# Patient Record
Sex: MALE | Race: WHITE | Employment: OTHER | URBAN - METROPOLITAN AREA
[De-identification: names, ages, dates, MRNs, and addresses within clinical notes are randomized per-mention and may not be internally consistent; named-entity substitution may affect disease eponyms.]

---

## 2017-04-11 DIAGNOSIS — J44.1 CHRONIC OBSTRUCTIVE PULMONARY DISEASE WITH ACUTE EXACERBATION (H): ICD-10-CM

## 2017-04-11 NOTE — TELEPHONE ENCOUNTER
VENTOLIN  (90 BASE) MCG/ACT Inhaler       Last Written Prescription Date: 10/10/16  Last Fill Quantity: 25.5g , # refills: 0    Last Office Visit with G, P or [facility] prescribing provider:  11/15/2016     Future Office Visit:       Date of Last Asthma Action Plan Letter:   There are no preventive care reminders to display for this patient.   Asthma Control Test: No flowsheet data found.    Date of Last Spirometry Test:   11/15/16    MRABELLA Arreola  April 11, 2017  4:13 PM

## 2017-04-12 RX ORDER — ALBUTEROL SULFATE 90 UG/1
2 AEROSOL, METERED RESPIRATORY (INHALATION) EVERY 6 HOURS PRN
Qty: 54 G | Refills: 5 | Status: SHIPPED | OUTPATIENT
Start: 2017-04-12 | End: 2017-07-21

## 2017-05-12 ENCOUNTER — TELEPHONE (OUTPATIENT)
Dept: FAMILY MEDICINE | Facility: CLINIC | Age: 72
End: 2017-05-12

## 2017-05-12 NOTE — TELEPHONE ENCOUNTER
Panel Management Review      Patient has the following on his problem list: None      Composite cancer screening  Chart review shows that this patient is due/due soon for the following Colonoscopy  Summary:    Patient is due/failing the following:   COLONOSCOPY    Action needed:   Patient needs referral/order: colonoscopy    Type of outreach:    Phone, spoke to patient.  Patient declined to do the colonoscopy    Questions for provider review:    None                                                                                                                                    Slim Quiñones [facility]        Chart routed to none .

## 2017-05-15 DIAGNOSIS — I25.10 ASCVD (ARTERIOSCLEROTIC CARDIOVASCULAR DISEASE): ICD-10-CM

## 2017-05-15 NOTE — TELEPHONE ENCOUNTER
clopidogrel (PLAVIX) 75 MG tablet        Last Written Prescription Date: 12/2/2016  Last Fill Quantity: 90,2/23/2017 # refills: 1    Last Office Visit with [facility] or [facility] prescribing provider:  11/15/2016-Dr Zapata   Future Office Visit:       Lab Results   Component Value Date    WBC 8.3 07/21/2014     Lab Results   Component Value Date    RBC 4.34 07/21/2014     Lab Results   Component Value Date    HGB 13.4 07/21/2014     Lab Results   Component Value Date    HCT 38.3 07/21/2014     No components found for: MCT  Lab Results   Component Value Date    MCV 88 07/21/2014     Lab Results   Component Value Date    MCH 30.9 07/21/2014     Lab Results   Component Value Date    MCHC 35.0 07/21/2014     Lab Results   Component Value Date    RDW 12.1 07/21/2014     Lab Results   Component Value Date     07/21/2014     Lab Results   Component Value Date    AST 38 10/08/2013     Lab Results   Component Value Date    ALT 25 11/15/2016     Creatinine   Date Value Ref Range Status   11/15/2016 0.76 0.66 - 1.25 mg/dL Final   ]

## 2017-05-17 RX ORDER — CLOPIDOGREL BISULFATE 75 MG/1
75 TABLET ORAL DAILY
Qty: 90 TABLET | Refills: 0 | Status: SHIPPED | OUTPATIENT
Start: 2017-05-17 | End: 2017-09-11

## 2017-05-17 NOTE — TELEPHONE ENCOUNTER
Routing refill request to provider to review approval because:  LABS NOT UTD, route if appointment due to inform pt  Yara Leiva RN, BSN  Message handled by Nurse Triage.

## 2017-05-26 NOTE — TELEPHONE ENCOUNTER
Patient informed. States it must have been an automatic refill-does not need refill at this time.

## 2017-06-13 DIAGNOSIS — J44.1 CHRONIC OBSTRUCTIVE PULMONARY DISEASE WITH ACUTE EXACERBATION (H): ICD-10-CM

## 2017-06-13 DIAGNOSIS — I25.10 ASCVD (ARTERIOSCLEROTIC CARDIOVASCULAR DISEASE): ICD-10-CM

## 2017-06-13 NOTE — TELEPHONE ENCOUNTER
Pending Prescriptions:                       Disp   Refills    SYMBICORT 160-4.5 MCG/ACT Inhaler [Pharma*10.2 g 4            Sig: INHALE TWO PUFFS BY MOUTH TWICE DAILY     lisinopril (PRINIVIL/ZESTRIL) 10 MG table*90 tab*0            Sig: TAKE ONE TABLET BY MOUTH ONE TIME DAILY         SYMBICORT       Last Written Prescription Date: 12/6/2016  Last Fill Quantity: 1 inhaler, # refills: 5    Last Office Visit with [facility] or [facility] prescribing provider:  11/15/2016Jodi       Future Office Visit:       Date of Last Asthma Action Plan Letter:   There are no preventive care reminders to display for this patient.   Asthma Control Test: No flowsheet data found.    Date of Last Spirometry Test:   No results found for this or any previous visit.        LISINOPRIL      Last Written Prescription Date: 12/2/2016  Last Fill Quantity: 90, # refills: 1  Last Office Visit with [facility] or [facility] prescribing provider: 11/15/2016Jodi           Potassium   Date Value Ref Range Status   11/15/2016 4.3 3.4 - 5.3 mmol/L Final     Creatinine   Date Value Ref Range Status   11/15/2016 0.76 0.66 - 1.25 mg/dL Final     BP Readings from Last 3 Encounters:   11/15/16 138/88   01/06/16 149/83   11/18/15 142/68

## 2017-06-15 ENCOUNTER — MYC REFILL (OUTPATIENT)
Dept: FAMILY MEDICINE | Facility: CLINIC | Age: 72
End: 2017-06-15

## 2017-06-15 DIAGNOSIS — I25.10 ASCVD (ARTERIOSCLEROTIC CARDIOVASCULAR DISEASE): ICD-10-CM

## 2017-06-15 DIAGNOSIS — J44.1 CHRONIC OBSTRUCTIVE PULMONARY DISEASE WITH ACUTE EXACERBATION (H): ICD-10-CM

## 2017-06-15 RX ORDER — BUDESONIDE AND FORMOTEROL FUMARATE DIHYDRATE 160; 4.5 UG/1; UG/1
2 AEROSOL RESPIRATORY (INHALATION) 2 TIMES DAILY
Qty: 10.2 G | Refills: 3 | Status: SHIPPED | OUTPATIENT
Start: 2017-06-15 | End: 2017-07-17

## 2017-06-15 RX ORDER — LISINOPRIL 10 MG/1
10 TABLET ORAL DAILY
Qty: 90 TABLET | Refills: 1 | Status: SHIPPED | OUTPATIENT
Start: 2017-06-15 | End: 2017-12-09

## 2017-06-15 RX ORDER — BUDESONIDE AND FORMOTEROL FUMARATE DIHYDRATE 160; 4.5 UG/1; UG/1
2 AEROSOL RESPIRATORY (INHALATION) 2 TIMES DAILY
Qty: 1 INHALER | Refills: 5 | Status: CANCELLED | OUTPATIENT
Start: 2017-06-15

## 2017-06-15 RX ORDER — LISINOPRIL 10 MG/1
10 TABLET ORAL DAILY
Qty: 90 TABLET | Refills: 1 | Status: CANCELLED | OUTPATIENT
Start: 2017-06-15

## 2017-06-15 NOTE — TELEPHONE ENCOUNTER
Prescription approved per [facility] [place]ill [place]  Rosa Patel RN BS

## 2017-06-15 NOTE — TELEPHONE ENCOUNTER
Message from MyChart:  Original authorizing provider: MD Abhijeet Hankins ERIKAWilmer ShaikhAlvino would like a refill of the following medications:  lisinopril (PRINIVIL/ZESTRIL) 10 MG tablet [Bob Zapata MD]  budesonide-formoterol (SYMBICORT) 160-4.5 MCG/ACT Inhaler [Bob Zapata MD]    Preferred pharmacy: [id number] [place], [place] - [address]    Comment:

## 2017-07-17 ENCOUNTER — MYC MEDICAL ADVICE (OUTPATIENT)
Dept: FAMILY MEDICINE | Facility: CLINIC | Age: 72
End: 2017-07-17

## 2017-07-17 DIAGNOSIS — J44.1 CHRONIC OBSTRUCTIVE PULMONARY DISEASE WITH ACUTE EXACERBATION (H): ICD-10-CM

## 2017-07-17 RX ORDER — BUDESONIDE AND FORMOTEROL FUMARATE DIHYDRATE 160; 4.5 UG/1; UG/1
2 AEROSOL RESPIRATORY (INHALATION) 2 TIMES DAILY
Qty: 10.2 G | Refills: 2 | Status: SHIPPED | OUTPATIENT
Start: 2017-07-17 | End: 2017-10-12

## 2017-07-17 NOTE — TELEPHONE ENCOUNTER
See below, has refills existing, resent   Prescription approved per [facility] [place].  Yara Leiva RN, BSN

## 2017-07-21 ENCOUNTER — OFFICE VISIT (OUTPATIENT)
Dept: FAMILY MEDICINE | Facility: CLINIC | Age: 72
End: 2017-07-21
Payer: MEDICARE

## 2017-07-21 ENCOUNTER — RADIANT APPOINTMENT (OUTPATIENT)
Dept: GENERAL RADIOLOGY | Facility: CLINIC | Age: 72
End: 2017-07-21
Attending: FAMILY MEDICINE
Payer: MEDICARE

## 2017-07-21 VITALS
RESPIRATION RATE: 12 BRPM | DIASTOLIC BLOOD PRESSURE: 76 MMHG | BODY MASS INDEX: 19.4 KG/M2 | HEART RATE: 97 BPM | WEIGHT: 128 LBS | TEMPERATURE: 98.1 F | HEIGHT: 68 IN | SYSTOLIC BLOOD PRESSURE: 137 MMHG | OXYGEN SATURATION: 93 %

## 2017-07-21 DIAGNOSIS — D50.0 IRON DEFICIENCY ANEMIA DUE TO CHRONIC BLOOD LOSS: ICD-10-CM

## 2017-07-21 DIAGNOSIS — Z71.6 ENCOUNTER FOR TOBACCO USE CESSATION COUNSELING: ICD-10-CM

## 2017-07-21 DIAGNOSIS — E78.5 HYPERLIPIDEMIA LDL GOAL <100: ICD-10-CM

## 2017-07-21 DIAGNOSIS — G40.909 SEIZURE DISORDER (H): Primary | ICD-10-CM

## 2017-07-21 DIAGNOSIS — J44.1 CHRONIC OBSTRUCTIVE PULMONARY DISEASE WITH ACUTE EXACERBATION (H): ICD-10-CM

## 2017-07-21 DIAGNOSIS — J44.1 COPD EXACERBATION (H): ICD-10-CM

## 2017-07-21 LAB
ERYTHROCYTE [DISTWIDTH] IN BLOOD BY AUTOMATED COUNT: 15.6 % (ref 10–15)
HCT VFR BLD AUTO: 29.9 % (ref 40–53)
HGB BLD-MCNC: 9.3 G/DL (ref 13.3–17.7)
MCH RBC QN AUTO: 25.4 PG (ref 26.5–33)
MCHC RBC AUTO-ENTMCNC: 31.1 G/DL (ref 31.5–36.5)
MCV RBC AUTO: 82 FL (ref 78–100)
PLATELET # BLD AUTO: 361 10E9/L (ref 150–450)
RBC # BLD AUTO: 3.66 10E12/L (ref 4.4–5.9)
WBC # BLD AUTO: 7.9 10E9/L (ref 4–11)

## 2017-07-21 PROCEDURE — 80053 COMPREHEN METABOLIC PANEL: CPT | Performed by: FAMILY MEDICINE

## 2017-07-21 PROCEDURE — 71020 XR CHEST 2 VW: CPT

## 2017-07-21 PROCEDURE — 83721 ASSAY OF BLOOD LIPOPROTEIN: CPT | Performed by: FAMILY MEDICINE

## 2017-07-21 PROCEDURE — 85027 COMPLETE CBC AUTOMATED: CPT | Performed by: FAMILY MEDICINE

## 2017-07-21 PROCEDURE — 99214 OFFICE O/P EST MOD 30 MIN: CPT | Performed by: FAMILY MEDICINE

## 2017-07-21 PROCEDURE — 36415 COLL VENOUS BLD VENIPUNCTURE: CPT | Performed by: FAMILY MEDICINE

## 2017-07-21 RX ORDER — ALBUTEROL SULFATE 90 UG/1
2 AEROSOL, METERED RESPIRATORY (INHALATION) EVERY 6 HOURS PRN
Qty: 54 G | Refills: 5 | Status: SHIPPED | OUTPATIENT
Start: 2017-07-21 | End: 2018-03-14

## 2017-07-21 RX ORDER — DOXYCYCLINE 100 MG/1
100 CAPSULE ORAL 2 TIMES DAILY
Qty: 20 CAPSULE | Refills: 3 | Status: SHIPPED | OUTPATIENT
Start: 2017-07-21 | End: 2018-03-14

## 2017-07-21 NOTE — MR AVS SNAPSHOT
After Visit Summary   7/21/2017    Abhijeet De Oliveira    MRN: [id number]           Patient Information     Date Of Birth          1945        Visit Information        Provider Department      7/21/2017 2:00 PM Bob Zapata MD [facility]        Today's Diagnoses     Seizure disorder (H)    -  1    COPD exacerbation (H)        Chronic obstructive pulmonary disease with acute exacerbation (H)        Hyperlipidemia LDL goal <100        Encounter for tobacco use cessation counseling           Follow-ups after your visit        Who to contact     If you have questions or need follow up information about today's clinic visit or your schedule please contact [facility] directly at [phone].  Normal or non-critical lab and imaging results will be communicated to you by MyChart, letter or phone within 4 business days after the clinic has received the results. If you do not hear from us within 7 days, please contact the clinic through [employer] or phone. If you have a critical or abnormal lab result, we will notify you by phone as soon as possible.  Submit refill requests through [employer] or call your pharmacy and they will forward the refill request to us. Please allow 3 business days for your refill to be completed.          Additional Information About Your Visit        MyChart Information     [employer] gives you secure access to your electronic health record. If you see a primary care provider, you can also send messages to your care team and make appointments. If you have questions, please call your primary care clinic.  If you do not have a primary care provider, please call [phone] and they will assist you.        Care EveryWhere ID     This is your Care EveryWhere ID. This could be used by other organizations to access your [facility] medical records  [id number]        Your Vitals Were     Pulse Temperature Respirations Height Pulse Oximetry BMI  "(Body Mass Index)    97 98.1  F (36.7  C) (Oral) 12 5' 8\" (1.727 m) 93% 19.46 kg/m2       Blood Pressure from Last 3 Encounters:   07/21/17 137/76   11/15/16 138/88   01/06/16 149/83    Weight from Last 3 Encounters:   07/21/17 128 lb (58.1 kg)   11/15/16 132 lb (59.9 kg)   11/18/15 134 lb (60.8 kg)              We Performed the Following     CBC with platelets     Comprehensive metabolic panel     LDL cholesterol direct     XR Chest 2 Views          Today's Medication Changes          These changes are accurate as of: 7/21/17 11:59 PM.  If you have any questions, ask your nurse or doctor.               These medicines have changed or have updated prescriptions.        Dose/Directions    * order for DME   This may have changed:  Another medication with the same name was added. Make sure you understand how and when to take each.   Used for:  COPD (chronic obstructive pulmonary disease) (H), Hypoxia   Changed by:  Kay Douglas MD        Equipment being ordered: Oxygen- 24 hour portable oxygen at 2 liter flow.   Quantity:  1 Device   Refills:  PRN       * order for DME   This may have changed:  You were already taking a medication with the same name, and this prescription was added. Make sure you understand how and when to take each.   Used for:  Chronic obstructive pulmonary disease with acute exacerbation (H)   Changed by:  Bob Zapata MD        Nebulizer machine   Quantity:  1 Device   Refills:  0       * Notice:  This list has 2 medication(s) that are the same as other medications prescribed for you. Read the directions carefully, and ask your doctor or other care provider to review them with you.         Where to get your medicines      These medications were sent to [id number] - [place], [place] - [address]  [address], [place] [address]     Phone:  [phone]     albuterol 108 (90 BASE) MCG/ACT Inhaler    doxycycline 100 MG capsule         Some of these will need a " paper prescription and others can be bought over the counter.  Ask your nurse if you have questions.     Bring a paper prescription for each of these medications     order for DME                Primary Care Provider Office Phone # Fax #    Bob Zapata [phone]       [facility] [address] [facility] [address]        Equal Access to Services     CONOR FENTON : Hadii aad ku hadasho Soomaali, waaxda luqadaha, qaybta kaalmada adeegyada, waxay idiin hayaan adeeg kharash la'satishn . So [facility] [phone].    ATENCIÓN: Si habla español, tiene a delarosa disposición servicios gratuitos de asistencia lingüística. Llame al [phone].    We comply with applicable federal civil rights laws and [place] laws. We do not discriminate on the basis of race, color, national origin, age, disability sex, sexual orientation or gender identity.            Thank you!     Thank you for choosing [facility]  for your care. Our goal is always to provide you with excellent care. Hearing back from our patients is one way we can continue to improve our services. Please take a few minutes to complete the written survey that you may receive in the mail after your visit with us. Thank you!             Your Updated Medication List - Protect others around you: Learn how to safely use, store and throw away your medicines at www.disposemymeds.org.          This list is accurate as of: 7/21/17 11:59 PM.  Always use your most recent med list.                   Brand Name Dispense Instructions for use Diagnosis    * albuterol 108 (90 BASE) MCG/ACT Inhaler    PROAIR HFA/PROVENTIL HFA/VENTOLIN HFA    3 Inhaler    Inhale 1-2 puffs into the lungs every 4 hours as needed    COPD (chronic obstructive pulmonary disease) (H)       * albuterol 108 (90 BASE) MCG/ACT Inhaler    VENTOLIN HFA    54 g    Inhale 2 puffs into the lungs every 6 hours as needed for shortness of breath / dyspnea or  wheezing    Chronic obstructive pulmonary disease with acute exacerbation (H)       atorvastatin 40 MG tablet    LIPITOR    90 tablet    Take 1 tablet (40 mg) by mouth daily    Hyperlipidemia LDL goal <70       azithromycin 250 MG tablet    ZITHROMAX    18 tablet    Two tablets first day, then one tablet daily for four days.  Begin z-rishabh on 1st day of each month.    COPD (chronic obstructive pulmonary disease) (H)       budesonide-formoterol 160-4.5 MCG/ACT Inhaler    SYMBICORT    10.2 g    Inhale 2 puffs into the lungs 2 times daily    Chronic obstructive pulmonary disease with acute exacerbation (H)       carBAMazepine 200 MG tablet    TEGretol    180 tablet    Take 1 tablet (200 mg) by mouth 2 times daily (1 every morning and 1 every evening)    Seizure disorder (H)       CENTRUM SILVER per tablet      Take 1 tablet by mouth daily 1 tablet daily- MENS        clopidogrel 75 MG tablet    PLAVIX    90 tablet    Take 1 tablet (75 mg) by mouth daily LAST REFILL SEE MD    ASCVD (arteriosclerotic cardiovascular disease)       doxycycline 100 MG capsule    VIBRAMYCIN    20 capsule    Take 1 capsule (100 mg) by mouth 2 times daily    COPD exacerbation (H)       ipratropium - albuterol 0.5 mg/2.5 mg/3 mL 0.5-2.5 (3) MG/3ML neb solution    DUONEB    400 vial    Take 1 vial (3 mLs) by nebulization 4 times daily scheduled and can take up to every 3 hours as needed for shortness of breath.  This order is for a dispense of  400 vials (which is 1200 ml).    Chronic obstructive pulmonary disease with acute exacerbation (H), COPD exacerbation (H)       lisinopril 10 MG tablet    PRINIVIL/ZESTRIL    90 tablet    Take 1 tablet (10 mg) by mouth daily    ASCVD (arteriosclerotic cardiovascular disease)       nitroGLYcerin 0.4 MG sublingual tablet    NITROSTAT    25 tablet    Place 1 tablet (0.4 mg) under the tongue every 5 minutes as needed for chest pain Max of 3 tabs.  If still with symptoms call 911.    ASCVD (arteriosclerotic  cardiovascular disease)       * order for DME     1 Device    Equipment being ordered: Oxygen- 24 hour portable oxygen at 2 liter flow.    COPD (chronic obstructive pulmonary disease) (H), Hypoxia       * order for DME     1 Device    Nebulizer machine    Chronic obstructive pulmonary disease with acute exacerbation (H)       * Notice:  This list has 4 medication(s) that are the same as other medications prescribed for you. Read the directions carefully, and ask your doctor or other care provider to review them with you.

## 2017-07-21 NOTE — PROGRESS NOTES
SUBJECTIVE:                                                    Abhijeet De Oliveira is a 72 year old male who presents to clinic today for the following health issues:    SUBJECTIVE:    CC: Abhijeet De Oliveira is a 72 year old male who presents for severe copd, still smokes but is battling to quit, eats well. Body mass index is 19.46 kg/(m^2).      HPI: no increase in his dyspnea. PFT in the past 9 months   No exacerbation currently.  Needs cancer screen cxr        PROBLEM LIST:                   Patient Active Problem List   Diagnosis     Seizure disorder (H)     Hyperlipidemia LDL goal <70     ASCVD (arteriosclerotic cardiovascular disease)     COPD (chronic obstructive pulmonary disease) (H)     COPD exacerbation (H)     Encounter for tobacco use cessation counseling       PAST MEDICAL HISTORY:                  Past Medical History:   Diagnosis Date     Arthritis      COPD (chronic obstructive pulmonary disease) (H)      Coronary artery disease     Pt. has had two MI's (2002 and 2007)     Hypercholesterolaemia      Osteoarthritis of both hips      Seizure disorder (H)     tegretol       PAST SURGICAL HISTORY:                  Past Surgical History:   Procedure Laterality Date     AMPUTATION      left middle finger     ANGIOPLASTY      coronary angioplasty w/stents 2002 & 2007     COLONOSCOPY       GALLBLADDER SURGERY  2001       CURRENT MEDICATIONS:                  Current Outpatient Prescriptions   Medication Sig Dispense Refill     doxycycline (VIBRAMYCIN) 100 MG capsule Take 1 capsule (100 mg) by mouth 2 times daily 20 capsule 3     albuterol (VENTOLIN HFA) 108 (90 BASE) MCG/ACT Inhaler Inhale 2 puffs into the lungs every 6 hours as needed for shortness of breath / dyspnea or wheezing 54 g 5     order for DME Nebulizer machine 1 Device 0     budesonide-formoterol (SYMBICORT) 160-4.5 MCG/ACT Inhaler Inhale 2 puffs into the lungs 2 times daily 10.2 g 2     lisinopril (PRINIVIL/ZESTRIL) 10 MG tablet Take 1  tablet (10 mg) by mouth daily 90 tablet 1     clopidogrel (PLAVIX) 75 MG tablet Take 1 tablet (75 mg) by mouth daily LAST REFILL SEE MD 90 tablet 0     [DISCONTINUED] albuterol (VENTOLIN HFA) 108 (90 BASE) MCG/ACT Inhaler Inhale 2 puffs into the lungs every 6 hours as needed for shortness of breath / dyspnea or wheezing 54 g 5     carBAMazepine (TEGRETOL) 200 MG tablet Take 1 tablet (200 mg) by mouth 2 times daily (1 every morning and 1 every evening) 180 tablet 3     ipratropium - albuterol 0.5 mg/2.5 mg/3 mL (DUONEB) 0.5-2.5 (3) MG/3ML nebulization Take 1 vial (3 mLs) by nebulization 4 times daily scheduled and can take up to every 3 hours as needed for shortness of breath.  This order is for a dispense of  400 vials (which is 1200 ml). 400 vial 3     atorvastatin (LIPITOR) 40 MG tablet Take 1 tablet (40 mg) by mouth daily 90 tablet 3     azithromycin (ZITHROMAX) 250 MG tablet Two tablets first day, then one tablet daily for four days.  Begin z-rishabh on 1st day of each month. 18 tablet 3     nitroglycerin (NITROSTAT) 0.4 MG SL tablet Place 1 tablet (0.4 mg) under the tongue every 5 minutes as needed for chest pain Max of 3 tabs.  If still with symptoms call 911. 25 tablet 0     albuterol (PROAIR HFA, PROVENTIL HFA, VENTOLIN HFA) 108 (90 BASE) MCG/ACT inhaler Inhale 1-2 puffs into the lungs every 4 hours as needed 3 Inhaler 0     ORDER FOR DME Equipment being ordered: Oxygen- 24 hour portable oxygen at 2 liter flow. 1 Device PRN     Multiple Vitamins-Minerals (CENTRUM SILVER) per tablet Take 1 tablet by mouth daily 1 tablet daily- MENS               FAMILY HISTORY:                   Family History   Problem Relation Age of Onset     Genetic Disorder Mother      Coronary Artery Disease Father      DIABETES Maternal Grandmother        HEALTH MAINTENANCE:                    REVIEW OF OUTSIDE RECORDS: NO    REVIEW OF SYSTEMS:  C: NEGATIVE for fever, chills  I: NEGATIVE for worrisome rashes, moles or lesions  E: NEGATIVE  "for vision changes   R: NEGATIVE for significant cough or SOB  CV: NEGATIVE for chest pain, palpitations   GI: NEGATIVE for nausea, abdominal pain, heartburn, or change in bowel habits  : NEGATIVE for frequency, dysuria, or hematuria  M: NEGATIVE for significant arthralgias or myalgia  N: NEGATIVE for weakness, dizziness or paresthesias or headache  NVS:no headache or balance issus  INTEG:no moles or new rashes  LYMPH:no nodes or night sweats    EXAM:    /76 (BP Location: Left arm, Patient Position: Chair, Cuff Size: Adult Regular)  Pulse 97  Temp 98.1  F (36.7  C) (Oral)  Resp 12  Ht 5' 8\" (1.727 m)  Wt 128 lb (58.1 kg)  SpO2 93%  BMI 19.46 kg/m2  GENERAL APPEARANCE: healthy, alert and no distress   EXAM:  GENERAL APPEARANCE: healthy, alert and no distress  EYES: EOMI,  PERRL  HENT: ear canals and TM's normal and nose and mouth without ulcers or lesions  RESP: lungs clear to auscultation - no rales, rhonchi or wheezes  CV: regular rates and rhythm, normal S1 S2, no S3 or S4 and no murmur, click or rub -  ABDOMEN:  soft, nontender, no HSM or masses and bowel sounds normal  MS: extremities normal- no gross deformities noted, no evidence of inflammation in joints, FROM in all extremities.  MS: evident kyphosis and hyperinflated chest   SKIN: no suspicious lesions or rashes  NEURO: Normal strength and tone, sensory exam grossly normal, mentation intact and speech normal  PSYCH: mentation appears normal and affect normal/bright  LYMPHATICS: No axillary, cervical, inguinal, or supraclavicular nodes  BACK:no tenderness or pain on straight let raise           ASSESSMENT/PLAN  1. Seizure disorder (H)    2. COPD exacerbation (H)    3. Chronic obstructive pulmonary disease with acute exacerbation (H)    4. Hyperlipidemia LDL goal <100    5. Encounter for tobacco use cessation counseling        Quit tobacco  6 month follow up with spirometry  Get FIT test  Monitor labs and meds                     "

## 2017-07-21 NOTE — NURSING NOTE
"Chief Complaint   Patient presents with     Musculoskeletal Problem       Initial /76 (BP Location: Left arm, Patient Position: Chair, Cuff Size: Adult Regular)  Pulse 97  Temp 98.1  F (36.7  C) (Oral)  Resp 12  Ht 5' 8\" (1.727 m)  Wt 128 lb (58.1 kg)  SpO2 93%  BMI 19.46 kg/m2 Estimated body mass index is 19.46 kg/(m^2) as calculated from the following:    Height as of this encounter: 5' 8\" (1.727 m).    Weight as of this encounter: 128 lb (58.1 kg).  Medication Reconciliation: complete   Slim Quiñones CMA       "

## 2017-07-22 LAB
ALBUMIN SERPL-MCNC: 3.4 G/DL (ref 3.4–5)
ALP SERPL-CCNC: 121 U/L (ref 40–150)
ALT SERPL W P-5'-P-CCNC: 14 U/L (ref 0–70)
ANION GAP SERPL CALCULATED.3IONS-SCNC: 7 MMOL/L (ref 3–14)
AST SERPL W P-5'-P-CCNC: 17 U/L (ref 0–45)
BILIRUB SERPL-MCNC: 0.3 MG/DL (ref 0.2–1.3)
BUN SERPL-MCNC: 6 MG/DL (ref 7–30)
CALCIUM SERPL-MCNC: 8.4 MG/DL (ref 8.5–10.1)
CHLORIDE SERPL-SCNC: 102 MMOL/L (ref 94–109)
CO2 SERPL-SCNC: 29 MMOL/L (ref 20–32)
CREAT SERPL-MCNC: 0.73 MG/DL (ref 0.66–1.25)
GFR SERPL CREATININE-BSD FRML MDRD: ABNORMAL ML/MIN/1.7M2
GLUCOSE SERPL-MCNC: 106 MG/DL (ref 70–99)
LDLC SERPL DIRECT ASSAY-MCNC: 69 MG/DL
POTASSIUM SERPL-SCNC: 4 MMOL/L (ref 3.4–5.3)
PROT SERPL-MCNC: 7.1 G/DL (ref 6.8–8.8)
SODIUM SERPL-SCNC: 138 MMOL/L (ref 133–144)

## 2017-07-24 RX ORDER — FERROUS GLUCONATE 324(38)MG
324 TABLET ORAL 2 TIMES DAILY
Qty: 60 TABLET | Refills: 1 | Status: SHIPPED | OUTPATIENT
Start: 2017-07-24 | End: 2019-01-09

## 2017-07-26 DIAGNOSIS — J44.1 COPD EXACERBATION (H): ICD-10-CM

## 2017-07-26 RX ORDER — PREDNISONE 20 MG/1
20 TABLET ORAL DAILY
Qty: 7 TABLET | Refills: 0 | Status: SHIPPED | OUTPATIENT
Start: 2017-07-26 | End: 2018-03-14

## 2017-07-26 NOTE — TELEPHONE ENCOUNTER
Routing refill request to provider to review approval because:  Drug not on the [facility], [facility] or [facility] refill protocol or controlled substance    Last appointment: 7/21/17  Yara Leiva RN, BSN  Message handled by Nurse Triage.

## 2017-07-26 NOTE — TELEPHONE ENCOUNTER
Controlled Substance Refill Request for Prednisone Oral Tablet 20 mg   Problem List Complete:  No     PROVIDER TO CONSIDER COMPLETION OF PROBLEM LIST AND OVERVIEW/CONTROLLED SUBSTANCE AGREEMENT    Last Written Prescription Date:  11/15/16  Last Fill Quantity: 10,   # refills: 3    Last Office Visit with G primary care provider: 11/20/16 Dr Zapata, DISCONTINUED     Future Office visit:     Controlled substance agreement on file: No.     Processing:  Fax Rx to [facility] pharmacy     checked in past 6 months?  No, route to RN

## 2017-08-14 DIAGNOSIS — E78.5 HYPERLIPIDEMIA LDL GOAL <70: ICD-10-CM

## 2017-08-16 RX ORDER — ATORVASTATIN CALCIUM 40 MG/1
TABLET, FILM COATED ORAL
Qty: 90 TABLET | Refills: 2 | Status: SHIPPED | OUTPATIENT
Start: 2017-08-16 | End: 2018-05-08

## 2017-08-16 NOTE — TELEPHONE ENCOUNTER
Prescription approved per [facility] Refill Protocol.    Per  pt not due for cholesterol check until next year

## 2017-08-21 DIAGNOSIS — J44.1 CHRONIC OBSTRUCTIVE PULMONARY DISEASE WITH ACUTE EXACERBATION (H): ICD-10-CM

## 2017-08-21 DIAGNOSIS — J44.1 COPD EXACERBATION (H): ICD-10-CM

## 2017-08-21 RX ORDER — IPRATROPIUM BROMIDE AND ALBUTEROL SULFATE 2.5; .5 MG/3ML; MG/3ML
SOLUTION RESPIRATORY (INHALATION)
Qty: 810 ML | Refills: 1 | Status: SHIPPED | OUTPATIENT
Start: 2017-08-21 | End: 2018-02-22

## 2017-08-21 NOTE — TELEPHONE ENCOUNTER
Ipratropium-Albuterol Inhalation Solution 0.5-2.5 (3) MG/3ML         Last Written Prescription Date: 11/15/16  Last Fill Quantity: 400, # refills: 3    [place] [facility] [facility] or [facility] prescribing provider:  07/21/17 Dr Zapata   Future Office Visit:       Date of Last Asthma Action Plan Letter:   There are no preventive care reminders to display for this patient.   Asthma Control Test: No flowsheet data found.    Date of Last Spirometry Test:   No results found for this or any previous visit.

## 2017-08-21 NOTE — TELEPHONE ENCOUNTER
Prescription approved per [facility] Refill [place].  Leonie Newby RN, BSN

## 2017-09-11 DIAGNOSIS — I25.10 ASCVD (ARTERIOSCLEROTIC CARDIOVASCULAR DISEASE): ICD-10-CM

## 2017-09-11 NOTE — TELEPHONE ENCOUNTER
Pending Prescriptions:                       Disp   Refills    clopidogrel (PLAVIX) 75 MG tablet [Pharma*90 tab*0            Sig: TAKE ONE TABLET BY MOUTH EVERY DAY. MAKE           APPOINTMENT FOR FURTHER REFILLS                   Last Written Prescription Date: 5/17/2017  Last Fill Quantity: 90, # refills: 0    Last Office Visit with G, [facility] or [facility] prescribing provider:  7/21/2017Jodi     Future Office Visit:       Lab Results   Component Value Date    WBC 7.9 07/21/2017     Lab Results   Component Value Date    RBC 3.66 07/21/2017     Lab Results   Component Value Date    HGB 9.3 07/21/2017     Lab Results   Component Value Date    HCT 29.9 07/21/2017     No components found for: MCT  Lab Results   Component Value Date    MCV 82 07/21/2017     Lab Results   Component Value Date    MCH 25.4 07/21/2017     Lab Results   Component Value Date    MCHC 31.1 07/21/2017     Lab Results   Component Value Date    RDW 15.6 07/21/2017     Lab Results   Component Value Date     07/21/2017     Lab Results   Component Value Date    AST 17 07/21/2017     Lab Results   Component Value Date    ALT 14 07/21/2017     Creatinine   Date Value Ref Range Status   07/21/2017 0.73 0.66 - 1.25 mg/dL Final   ]

## 2017-09-14 RX ORDER — CLOPIDOGREL BISULFATE 75 MG/1
75 TABLET ORAL DAILY
Qty: 90 TABLET | Refills: 0 | Status: SHIPPED | OUTPATIENT
Start: 2017-09-14 | End: 2017-12-09

## 2017-09-14 NOTE — TELEPHONE ENCOUNTER
Prescription approved per [facility] Refill Protocol.    Lynsey VARGAS RN, BSN, PHN  [place] Flex RN

## 2017-10-12 DIAGNOSIS — J44.1 CHRONIC OBSTRUCTIVE PULMONARY DISEASE WITH ACUTE EXACERBATION (H): ICD-10-CM

## 2017-10-12 RX ORDER — BUDESONIDE AND FORMOTEROL FUMARATE DIHYDRATE 160; 4.5 UG/1; UG/1
2 AEROSOL RESPIRATORY (INHALATION) 2 TIMES DAILY
Qty: 10.2 G | Refills: 2 | Status: SHIPPED | OUTPATIENT
Start: 2017-10-12 | End: 2018-01-09

## 2017-10-12 NOTE — TELEPHONE ENCOUNTER
Prescription approved per [facility] Refill Protocol.  Yara Leiva RN, BSN

## 2017-10-12 NOTE — TELEPHONE ENCOUNTER
Budesonide 160-4.5 mcg        Last Written Prescription Date: 07/17/17  Last Fill Quantity: 10.2g, # refills: 2    Last Office Visit with [facility] or [facility] prescribing provider:  07/21/17 Dr Zapata   Future Office Visit:       Date of Last Asthma Action Plan Letter:   There are no preventive care reminders to display for this patient.   Asthma Control Test: No flowsheet data found.    Date of Last Spirometry Test:   No results found for this or any previous visit.

## 2017-12-09 DIAGNOSIS — I25.10 ASCVD (ARTERIOSCLEROTIC CARDIOVASCULAR DISEASE): ICD-10-CM

## 2017-12-12 RX ORDER — LISINOPRIL 10 MG/1
10 TABLET ORAL DAILY
Qty: 90 TABLET | Refills: 1 | Status: SHIPPED | OUTPATIENT
Start: 2017-12-12 | End: 2018-07-05

## 2017-12-12 RX ORDER — CLOPIDOGREL BISULFATE 75 MG/1
TABLET ORAL
Qty: 90 TABLET | Refills: 1 | Status: SHIPPED | OUTPATIENT
Start: 2017-12-12 | End: 2018-06-05

## 2017-12-12 NOTE — TELEPHONE ENCOUNTER
Routing refill request to provider for review/approval because:  Labs out of range:  Hgb    Med is pended.     Emerita Shah RN -- [facility]

## 2017-12-12 NOTE — TELEPHONE ENCOUNTER
Requested Prescriptions   Pending Prescriptions Disp Refills     clopidogrel (PLAVIX) 75 MG tablet [Pharmacy Med Name: Clopidogrel Bisulfate Oral Tablet 75 MG] 90 tablet 0     Sig: Take 1 tablet by mouth daily    Plavix Passed    12/10/2017  9:31 AM       Passed - No active PPI on record unless is Protonix       Passed - Normal HGB on file in past 12 months    Recent Labs   Lab Test  07/21/17   1449   HGB  9.3*              Passed - Normal Platelets on file in past 12 months    Recent Labs   Lab Test  07/21/17   1449   PLT  361              Passed - Recent or future visit with authorizing provider's specialty    Patient had office visit in the last year or has a visit in the next 30 days with authorizing provider.  See chart review.              Passed - Patient is age 18 or older        lisinopril (PRINIVIL/ZESTRIL) 10 MG tablet [Pharmacy Med Name: Lisinopril Oral Tablet 10 MG] 90 tablet 0     Sig: TAKE ONE TABLET BY MOUTH DAILY    ACE Inhibitors (Including Combos) Protocol Passed    12/10/2017  9:31 AM       Passed - Blood pressure under 140/90    BP Readings from Last 3 Encounters:   07/21/17 137/76   11/15/16 138/88   01/06/16 149/83                Passed - Recent or future visit with authorizing provider's specialty    Patient had office visit in the last year or has a visit in the next 30 days with authorizing provider.  See chart review.              Passed - Patient is age 18 or older       Passed - Normal serum creatinine on file in past 12 months    Recent Labs   Lab Test  07/21/17   1449   CR  0.73            Passed - Normal serum potassium on file in past 12 months    Recent Labs   Lab Test  07/21/17   1449   POTASSIUM  4.0

## 2018-01-09 DIAGNOSIS — J44.1 CHRONIC OBSTRUCTIVE PULMONARY DISEASE WITH ACUTE EXACERBATION (H): ICD-10-CM

## 2018-01-09 RX ORDER — BUDESONIDE AND FORMOTEROL FUMARATE DIHYDRATE 160; 4.5 UG/1; UG/1
AEROSOL RESPIRATORY (INHALATION)
Qty: 1 INHALER | Refills: 0 | Status: SHIPPED | OUTPATIENT
Start: 2018-01-09 | End: 2018-02-12

## 2018-01-09 NOTE — TELEPHONE ENCOUNTER
Spoke with pt.  Per LOV:      He states he will call back to schedule    Medication is being filled for 1 time refill only due to:  Patient needs to be seen because needs OV with spirometry.

## 2018-01-09 NOTE — TELEPHONE ENCOUNTER
"Requested Prescriptions   Pending Prescriptions Disp Refills     SYMBICORT 160-4.5 MCG/ACT Inhaler [Pharmacy Med Name: Symbicort Inhalation Aerosol 160-4.5 MCG/ACT] 10.2 g 1    Last Written Prescription Date:  10/12/17  Last Fill Quantity: 10.2 g   ,  # refills: 2   Last Office Visit with [facility] or [facility] prescribing provider:  7/21/2017   Future Office Visit:      Sig: Inhale 2 puffs into the lungs 2 times daily    Inhaled Steroids Protocol Passed    1/9/2018 11:46 AM       Passed - Patient is age 12 or older       Passed - Recent or future visit with authorizing provider's specialty    Patient had office visit in the last year or has a visit in the next 30 days with authorizing provider.  See \"Patient Info\" tab in inbasket, or \"Choose Columns\" in Meds & Orders section of the refill encounter.                 "

## 2018-02-12 ENCOUNTER — MYC MEDICAL ADVICE (OUTPATIENT)
Dept: FAMILY MEDICINE | Facility: CLINIC | Age: 73
End: 2018-02-12

## 2018-02-12 DIAGNOSIS — J44.1 CHRONIC OBSTRUCTIVE PULMONARY DISEASE WITH ACUTE EXACERBATION (H): ICD-10-CM

## 2018-02-12 RX ORDER — BUDESONIDE AND FORMOTEROL FUMARATE DIHYDRATE 160; 4.5 UG/1; UG/1
AEROSOL RESPIRATORY (INHALATION)
Qty: 10.2 G | Refills: 0 | Status: SHIPPED | OUTPATIENT
Start: 2018-02-12 | End: 2018-03-14

## 2018-02-12 NOTE — TELEPHONE ENCOUNTER
See below, refill extended  Prescription approved per [facility] Refill Protocol.  Yara Leiva, RN, BSN

## 2018-02-12 NOTE — TELEPHONE ENCOUNTER
"Requested Prescriptions   Pending Prescriptions Disp Refills     SYMBICORT 160-4.5 MCG/ACT Inhaler [Pharmacy Med Name: Symbicort Inhalation Aerosol 160-4.5 MCG/ACT]  Last Written Prescription Date:  1/9/2018  Last Fill Quantity: 1 inhaler,  # refills: 0   Last office visit: 7/21/2017 with prescribing provider:  Jodi Torres2 roman 0     Sig: Inhale 2 puffs into the lungs 2 times daily. SCHEDULE APPOINTMENT FOR FURTHER REFILLS    Inhaled Steroids Protocol Passed    2/12/2018 12:38 PM       Passed - Patient is age 12 or older       Passed - Recent or future visit with authorizing provider's specialty    Patient had office visit in the last year or has a visit in the next 30 days with authorizing provider.  See \"Patient Info\" tab in inbasket, or \"Choose Columns\" in Meds & Orders section of the refill encounter.               "

## 2018-02-12 NOTE — TELEPHONE ENCOUNTER
Prescription approved per [facility] Refill Protocol-see other encounter  Yara Leiva, RN, BSN

## 2018-02-22 DIAGNOSIS — J44.1 COPD EXACERBATION (H): ICD-10-CM

## 2018-02-22 DIAGNOSIS — J44.1 CHRONIC OBSTRUCTIVE PULMONARY DISEASE WITH ACUTE EXACERBATION (H): ICD-10-CM

## 2018-02-22 NOTE — TELEPHONE ENCOUNTER
"Requested Prescriptions   Pending Prescriptions Disp Refills     ipratropium - albuterol 0.5 mg/2.5 mg/3 mL (DUONEB) 0.5-2.5 (3) MG/3ML neb solution [Pharmacy Med Name: Ipratropium-Albuterol Inhalation Solution 0.5-2.5 (3) MG/3ML]    Last Written Prescription Date:  8/21/17  Last Fill Quantity: 810mL,  # refills: 1   Last office visit: 7/21/2017 with prescribing provider:  Jodi 7/21/17   Future Office Visit:   810 mL 0     Sig: USE 1 VIAL VIA NEBULIZER 4 TIMES DAILY SCHEDULED AND USE EVERY 3 HOURS AS NEEDED FOR SHORTNESS OF BREATH    Short-Acting Beta Agonist Inhalers Protocol  Passed    2/22/2018  1:07 PM       Passed - Patient is age 12 or older       Passed - Recent or future visit with authorizing provider's specialty    Patient had office visit in the last year or has a visit in the next 30 days with authorizing provider.  See \"Patient Info\" tab in inbasket, or \"Choose Columns\" in Meds & Orders section of the refill encounter.             "

## 2018-02-23 RX ORDER — IPRATROPIUM BROMIDE AND ALBUTEROL SULFATE 2.5; .5 MG/3ML; MG/3ML
SOLUTION RESPIRATORY (INHALATION)
Qty: 810 ML | Refills: 0 | Status: SHIPPED | OUTPATIENT
Start: 2018-02-23 | End: 2018-06-05

## 2018-02-23 NOTE — TELEPHONE ENCOUNTER
Prescription approved per [facility] Refill Protocol  Rosa Patel RN BS

## 2018-03-13 DIAGNOSIS — G40.909 SEIZURE DISORDER (H): ICD-10-CM

## 2018-03-13 RX ORDER — CARBAMAZEPINE 200 MG/1
TABLET ORAL
Qty: 180 TABLET | Refills: 2 | Status: CANCELLED | OUTPATIENT
Start: 2018-03-13

## 2018-03-14 ENCOUNTER — OFFICE VISIT (OUTPATIENT)
Dept: FAMILY MEDICINE | Facility: CLINIC | Age: 73
End: 2018-03-14
Payer: MEDICARE

## 2018-03-14 VITALS
SYSTOLIC BLOOD PRESSURE: 138 MMHG | WEIGHT: 127.7 LBS | DIASTOLIC BLOOD PRESSURE: 81 MMHG | OXYGEN SATURATION: 97 % | TEMPERATURE: 98 F | RESPIRATION RATE: 14 BRPM | BODY MASS INDEX: 19.42 KG/M2 | HEART RATE: 102 BPM

## 2018-03-14 DIAGNOSIS — Z23 NEED FOR PROPHYLACTIC VACCINATION AND INOCULATION AGAINST INFLUENZA: Primary | ICD-10-CM

## 2018-03-14 DIAGNOSIS — J44.1 CHRONIC OBSTRUCTIVE PULMONARY DISEASE WITH ACUTE EXACERBATION (H): ICD-10-CM

## 2018-03-14 DIAGNOSIS — J44.1 COPD EXACERBATION (H): ICD-10-CM

## 2018-03-14 DIAGNOSIS — G40.909 SEIZURE DISORDER (H): ICD-10-CM

## 2018-03-14 PROCEDURE — 90662 IIV NO PRSV INCREASED AG IM: CPT | Performed by: FAMILY MEDICINE

## 2018-03-14 PROCEDURE — G0008 ADMIN INFLUENZA VIRUS VAC: HCPCS | Performed by: FAMILY MEDICINE

## 2018-03-14 PROCEDURE — 99214 OFFICE O/P EST MOD 30 MIN: CPT | Mod: 25 | Performed by: FAMILY MEDICINE

## 2018-03-14 RX ORDER — CARBAMAZEPINE 200 MG/1
200 TABLET ORAL 2 TIMES DAILY
Qty: 180 TABLET | Refills: 3 | Status: SHIPPED | OUTPATIENT
Start: 2018-03-14

## 2018-03-14 RX ORDER — BUDESONIDE AND FORMOTEROL FUMARATE DIHYDRATE 160; 4.5 UG/1; UG/1
AEROSOL RESPIRATORY (INHALATION)
Qty: 10.2 G | Refills: 11 | Status: SHIPPED | OUTPATIENT
Start: 2018-03-14 | End: 2019-01-08

## 2018-03-14 RX ORDER — ALBUTEROL SULFATE 90 UG/1
2 AEROSOL, METERED RESPIRATORY (INHALATION) EVERY 6 HOURS PRN
Qty: 54 G | Refills: 5 | Status: SHIPPED | OUTPATIENT
Start: 2018-03-14 | End: 2019-01-08

## 2018-03-14 RX ORDER — PREDNISONE 20 MG/1
20 TABLET ORAL DAILY
Qty: 7 TABLET | Refills: 3 | Status: SHIPPED | OUTPATIENT
Start: 2018-03-14 | End: 2018-08-16

## 2018-03-14 RX ORDER — DOXYCYCLINE 100 MG/1
100 CAPSULE ORAL 2 TIMES DAILY
Qty: 20 CAPSULE | Refills: 3 | Status: SHIPPED | OUTPATIENT
Start: 2018-03-14 | End: 2018-08-16

## 2018-03-14 NOTE — MR AVS SNAPSHOT
After Visit Summary   3/14/2018    Abhijeet De Oliveira    MRN: [id number]           Patient Information     Date Of Birth          1945        Visit Information        Provider Department      3/14/2018 3:30 PM Bob Zapata MD [facility]        Today's Diagnoses     COPD exacerbation (H)        Chronic obstructive pulmonary disease with acute exacerbation (H)        Seizure disorder (H)           Follow-ups after your visit        Who to contact     If you have questions or need follow up information about today's clinic visit or your schedule please contact [facility] directly at [phone].  Normal or non-critical lab and imaging results will be communicated to you by MyChart, letter or phone within 4 business days after the clinic has received the results. If you do not hear from us within 7 days, please contact the clinic through [employer] or phone. If you have a critical or abnormal lab result, we will notify you by phone as soon as possible.  Submit refill requests through [employer] or call your pharmacy and they will forward the refill request to us. Please allow 3 business days for your refill to be completed.          Additional Information About Your Visit        MyChart Information     [employer] gives you secure access to your electronic health record. If you see a primary care provider, you can also send messages to your care team and make appointments. If you have questions, please call your primary care clinic.  If you do not have a primary care provider, please call [phone] and they will assist you.        Care EveryWhere ID     This is your Care EveryWhere ID. This could be used by other organizations to access your [place] medical records  [id number]        Your Vitals Were     Pulse Temperature Respirations Pulse Oximetry BMI (Body Mass Index)       102 98  F (36.7  C) (Oral) 14 97% 19.42 kg/m2        Blood Pressure from Last  3 Encounters:   03/14/18 138/81   07/21/17 137/76   11/15/16 138/88    Weight from Last 3 Encounters:   03/14/18 127 lb 11.2 oz (57.9 kg)   07/21/17 128 lb (58.1 kg)   11/15/16 132 lb (59.9 kg)              Today, you had the following     No orders found for display         Today's Medication Changes          These changes are accurate as of 3/14/18  3:52 PM.  If you have any questions, ask your nurse or doctor.               These medicines have changed or have updated prescriptions.        Dose/Directions    * albuterol 108 (90 BASE) MCG/ACT Inhaler   Commonly known as:  PROAIR HFA/PROVENTIL HFA/VENTOLIN HFA   This may have changed:  Another medication with the same name was added. Make sure you understand how and when to take each.   Used for:  COPD (chronic obstructive pulmonary disease) (H)   Changed by:  Bob Zapata MD        Dose:  1-2 puff   Inhale 1-2 puffs into the lungs every 4 hours as needed   Quantity:  3 Inhaler   Refills:  0       * albuterol 108 (90 BASE) MCG/ACT Inhaler   Commonly known as:  VENTOLIN HFA   This may have changed:  You were already taking a medication with the same name, and this prescription was added. Make sure you understand how and when to take each.   Used for:  Chronic obstructive pulmonary disease with acute exacerbation (H)   Changed by:  Bob Zapata MD        Dose:  2 puff   Inhale 2 puffs into the lungs every 6 hours as needed for shortness of breath / dyspnea or wheezing   Quantity:  54 g   Refills:  5       * albuterol 108 (90 BASE) MCG/ACT Inhaler   Commonly known as:  VENTOLIN HFA   This may have changed:  You were already taking a medication with the same name, and this prescription was added. Make sure you understand how and when to take each.   Used for:  Chronic obstructive pulmonary disease with acute exacerbation (H)   Changed by:  Bob Zapata MD        Dose:  2 puff   Inhale 2 puffs into the lungs every 6 hours as needed for  shortness of breath / dyspnea or wheezing   Quantity:  54 g   Refills:  5       budesonide-formoterol 160-4.5 MCG/ACT Inhaler   Commonly known as:  SYMBICORT   This may have changed:  See the new instructions.   Used for:  Chronic obstructive pulmonary disease with acute exacerbation (H)   Changed by:  Bob Zapata MD        Inhale 2 puffs into the lungs 2 times daily. SCHEDULE APPOINTMENT FOR FURTHER REFILLS   Quantity:  10.2 g   Refills:  11       * Notice:  This list has 3 medication(s) that are the same as other medications prescribed for you. Read the directions carefully, and ask your doctor or other care provider to review them with you.      Stop taking these medicines if you haven't already. Please contact your care team if you have questions.     azithromycin 250 MG tablet   Commonly known as:  ZITHROMAX   Stopped by:  Bob Zapata MD                Where to get your medicines      These medications were sent to [id number] [place], [place] - [address]  [address], [facility] [address]     Phone:  [phone]     albuterol 108 (90 BASE) MCG/ACT Inhaler    albuterol 108 (90 BASE) MCG/ACT Inhaler    budesonide-formoterol 160-4.5 MCG/ACT Inhaler    carBAMazepine 200 MG tablet    doxycycline 100 MG capsule    predniSONE 20 MG tablet                Primary Care Provider Office Phone # Fax #    Bob Zapata [phone] [address] [facility] [address]        Equal Access to Services     [facility] AH: Hadii santiago ku hadasho Sokevinali, waaxda luqadaha, qaybta kaalmada adeegyada, laxmi wheeler. So [facility] [phone].    ATENCIÓN: Si habla español, tiene a delarosa disposición servicios gratuitos de asistencia lingüística. Destinee al [phone].    We comply with applicable federal civil rights laws and [place] laws. We do not discriminate on the basis of race, color, national origin, age, disability, sex, sexual  orientation, or gender identity.            Thank you!     Thank you for choosing [facility]  for your care. Our goal is always to provide you with excellent care. Hearing back from our patients is one way we can continue to improve our services. Please take a few minutes to complete the written survey that you may receive in the mail after your visit with us. Thank you!             Your Updated Medication List - Protect others around you: Learn how to safely use, store and throw away your medicines at www.disposemymeds.org.          This list is accurate as of 3/14/18  3:52 PM.  Always use your most recent med list.                   Brand Name Dispense Instructions for use Diagnosis    * albuterol 108 (90 BASE) MCG/ACT Inhaler    PROAIR HFA/PROVENTIL HFA/VENTOLIN HFA    3 Inhaler    Inhale 1-2 puffs into the lungs every 4 hours as needed    COPD (chronic obstructive pulmonary disease) (H)       * albuterol 108 (90 BASE) MCG/ACT Inhaler    VENTOLIN HFA    54 g    Inhale 2 puffs into the lungs every 6 hours as needed for shortness of breath / dyspnea or wheezing    Chronic obstructive pulmonary disease with acute exacerbation (H)       * albuterol 108 (90 BASE) MCG/ACT Inhaler    VENTOLIN HFA    54 g    Inhale 2 puffs into the lungs every 6 hours as needed for shortness of breath / dyspnea or wheezing    Chronic obstructive pulmonary disease with acute exacerbation (H)       atorvastatin 40 MG tablet    LIPITOR    90 tablet    TAKE 1 TABLET (40 MG) BY MOUTH DAILY    Hyperlipidemia LDL goal <70       budesonide-formoterol 160-4.5 MCG/ACT Inhaler    SYMBICORT    10.2 g    Inhale 2 puffs into the lungs 2 times daily. SCHEDULE APPOINTMENT FOR FURTHER REFILLS    Chronic obstructive pulmonary disease with acute exacerbation (H)       carBAMazepine 200 MG tablet    TEGretol    180 tablet    Take 1 tablet (200 mg) by mouth 2 times daily (1 every morning and 1 every evening)    Seizure disorder (H)        CENTRUM SILVER per tablet      Take 1 tablet by mouth daily 1 tablet daily- MENS        clopidogrel 75 MG tablet    PLAVIX    90 tablet    Take 1 tablet by mouth daily    ASCVD (arteriosclerotic cardiovascular disease)       doxycycline 100 MG capsule    VIBRAMYCIN    20 capsule    Take 1 capsule (100 mg) by mouth 2 times daily    COPD exacerbation (H)       ferrous gluconate 324 (38 FE) MG tablet    FERGON    60 tablet    Take 1 tablet (324 mg) by mouth 2 times daily    Iron deficiency anemia due to chronic blood loss       ipratropium - albuterol 0.5 mg/2.5 mg/3 mL 0.5-2.5 (3) MG/3ML neb solution    DUONEB    810 mL    USE 1 VIAL VIA NEBULIZER 4 TIMES DAILY SCHEDULED AND USE EVERY 3 HOURS AS NEEDED FOR SHORTNESS OF BREATH    Chronic obstructive pulmonary disease with acute exacerbation (H), COPD exacerbation (H)       lisinopril 10 MG tablet    PRINIVIL/ZESTRIL    90 tablet    Take 1 tablet (10 mg) by mouth daily SEE DOCTOR IN JANUARY    ASCVD (arteriosclerotic cardiovascular disease)       nitroGLYcerin 0.4 MG sublingual tablet    NITROSTAT    25 tablet    Place 1 tablet (0.4 mg) under the tongue every 5 minutes as needed for chest pain Max of 3 tabs.  If still with symptoms call 911.    ASCVD (arteriosclerotic cardiovascular disease)       * order for DME     1 Device    Equipment being ordered: Oxygen- 24 hour portable oxygen at 2 liter flow.    COPD (chronic obstructive pulmonary disease) (H), Hypoxia       * order for DME     1 Device    Nebulizer machine    Chronic obstructive pulmonary disease with acute exacerbation (H)       predniSONE 20 MG tablet    DELTASONE    7 tablet    Take 1 tablet (20 mg) by mouth daily    COPD exacerbation (H)       * Notice:  This list has 5 medication(s) that are the same as other medications prescribed for you. Read the directions carefully, and ask your doctor or other care provider to review them with you.

## 2018-03-14 NOTE — PROGRESS NOTES
SUBJECTIVE:   Abhijeet De Oliveira is a 73 year old male who presents to clinic today for the following health issues:    SUBJECTIVE:here for medicare wellness exam and copd follow up with med refills     Uses a walker, some increased fall risk but he's very careful   Cognitive screen fully able, three objects, alert , oriented and well groomed   Current Outpatient Prescriptions   Medication     doxycycline (VIBRAMYCIN) 100 MG capsule     predniSONE (DELTASONE) 20 MG tablet     budesonide-formoterol (SYMBICORT) 160-4.5 MCG/ACT Inhaler     albuterol (VENTOLIN HFA) 108 (90 BASE) MCG/ACT Inhaler     albuterol (VENTOLIN HFA) 108 (90 BASE) MCG/ACT Inhaler     carBAMazepine (TEGRETOL) 200 MG tablet     ipratropium - albuterol 0.5 mg/2.5 mg/3 mL (DUONEB) 0.5-2.5 (3) MG/3ML neb solution     clopidogrel (PLAVIX) 75 MG tablet     lisinopril (PRINIVIL/ZESTRIL) 10 MG tablet     atorvastatin (LIPITOR) 40 MG tablet     ferrous gluconate (FERGON) 324 (38 FE) MG tablet     order for DME     nitroglycerin (NITROSTAT) 0.4 MG SL tablet     albuterol (PROAIR HFA, PROVENTIL HFA, VENTOLIN HFA) 108 (90 BASE) MCG/ACT inhaler     ORDER FOR DME     Multiple Vitamins-Minerals (CENTRUM SILVER) per tablet     [DISCONTINUED] SYMBICORT 160-4.5 MCG/ACT Inhaler     [DISCONTINUED] albuterol (VENTOLIN HFA) 108 (90 BASE) MCG/ACT Inhaler     [DISCONTINUED] carBAMazepine (TEGRETOL) 200 MG tablet     No current facility-administered medications for this visit.      Hearing is at baseline, vision is adequate     Diet review, emphasizes protein      CC: Abhijeet De Oliveira is a 73 year old male with severe copd who presents for follow up copd  And asvd. Previously followed at [facility].     HPI: lives alone here in [place], no chest pain, his level of copd has been stable     He keeps doxycycline on hand for flareup issues     PROBLEM LIST:                   Patient Active Problem List   Diagnosis     Seizure disorder (H)     Hyperlipidemia LDL goal <70      ASCVD (arteriosclerotic cardiovascular disease)     COPD (chronic obstructive pulmonary disease) (H)     COPD exacerbation (H)     Encounter for tobacco use cessation counseling       PAST MEDICAL HISTORY:                  Past Medical History:   Diagnosis Date     Arthritis      COPD (chronic obstructive pulmonary disease) (H)      Coronary artery disease     Pt. has had two MI's (2002 and 2007)     Hypercholesterolaemia      Osteoarthritis of both hips      Seizure disorder (H)     tegretol       PAST SURGICAL HISTORY:                  Past Surgical History:   Procedure Laterality Date     AMPUTATION      left middle finger     ANGIOPLASTY      coronary angioplasty w/stents 2002 & 2007     COLONOSCOPY       GALLBLADDER SURGERY  2001       CURRENT MEDICATIONS:                  Current Outpatient Prescriptions   Medication Sig Dispense Refill     ipratropium - albuterol 0.5 mg/2.5 mg/3 mL (DUONEB) 0.5-2.5 (3) MG/3ML neb solution USE 1 VIAL VIA NEBULIZER 4 TIMES DAILY SCHEDULED AND USE EVERY 3 HOURS AS NEEDED FOR SHORTNESS OF BREATH 810 mL 0     SYMBICORT 160-4.5 MCG/ACT Inhaler Inhale 2 puffs into the lungs 2 times daily. SCHEDULE APPOINTMENT FOR FURTHER REFILLS 10.2 g 0     clopidogrel (PLAVIX) 75 MG tablet Take 1 tablet by mouth daily 90 tablet 1     lisinopril (PRINIVIL/ZESTRIL) 10 MG tablet Take 1 tablet (10 mg) by mouth daily SEE DOCTOR IN JANUARY 90 tablet 1     atorvastatin (LIPITOR) 40 MG tablet TAKE 1 TABLET (40 MG) BY MOUTH DAILY 90 tablet 2     predniSONE (DELTASONE) 20 MG tablet Take 1 tablet (20 mg) by mouth daily 7 tablet 0     ferrous gluconate (FERGON) 324 (38 FE) MG tablet Take 1 tablet (324 mg) by mouth 2 times daily 60 tablet 1     doxycycline (VIBRAMYCIN) 100 MG capsule Take 1 capsule (100 mg) by mouth 2 times daily 20 capsule 3     albuterol (VENTOLIN HFA) 108 (90 BASE) MCG/ACT Inhaler Inhale 2 puffs into the lungs every 6 hours as needed for shortness of breath / dyspnea or wheezing 54 g 5      order for DME Nebulizer machine 1 Device 0     carBAMazepine (TEGRETOL) 200 MG tablet Take 1 tablet (200 mg) by mouth 2 times daily (1 every morning and 1 every evening) 180 tablet 3     azithromycin (ZITHROMAX) 250 MG tablet Two tablets first day, then one tablet daily for four days.  Begin z-rishabh on 1st day of each month. 18 tablet 3     nitroglycerin (NITROSTAT) 0.4 MG SL tablet Place 1 tablet (0.4 mg) under the tongue every 5 minutes as needed for chest pain Max of 3 tabs.  If still with symptoms call 911. 25 tablet 0     albuterol (PROAIR HFA, PROVENTIL HFA, VENTOLIN HFA) 108 (90 BASE) MCG/ACT inhaler Inhale 1-2 puffs into the lungs every 4 hours as needed 3 Inhaler 0     ORDER FOR DME Equipment being ordered: Oxygen- 24 hour portable oxygen at 2 liter flow. 1 Device PRN     Multiple Vitamins-Minerals (CENTRUM SILVER) per tablet Take 1 tablet by mouth daily 1 tablet daily- MENS               FAMILY HISTORY:                   Family History   Problem Relation Age of Onset     Genetic Disorder Mother      Coronary Artery Disease Father      DIABETES Maternal Grandmother        HEALTH MAINTENANCE:                    REVIEW OF OUTSIDE RECORDS: NO    REVIEW OF SYSTEMS:  CONSTITUTIONAL: NEGATIVE for fever, chills  INTEGUMENTARY/SKIN: NEGATIVE for worrisome rashes, moles or lesions  EYES: NEGATIVE for vision changes   ENT/MOUTH: NEGATIVE for ear, mouth and throat problems  RESP:NEGATIVE for significant cough or SOB, cough-non productive, dyspnea on exertion and Hx COPD  CV: NEGATIVE for chest pain, palpitations   GI: NEGATIVE for nausea, abdominal pain, heartburn, or change in bowel habits  : NEGATIVE for frequency, dysuria, or hematuria  MUSCULOSKELETAL:kyphosis, arthralgias knees and hips and also  and back pain  NEURO: NEGATIVE for weakness, dizziness or paresthesias or headache  NVS:no headache or balance issus  INTEG:no moles or new rashes  LYMPH:no nodes or night sweats    EXAM:    GENERAL APPEARANCE:  healthy, alert and no distress   EXAM:  GENERAL APPEARANCE: healthy, alert and no distress  EYES: EOMI,  PERRL  HENT: ear canals and TM's normal and nose and mouth without ulcers or lesions  NECK: no adenopathy, no asymmetry, masses, or scars and thyroid normal to palpation  RESP: lungs clear to auscultation - no rales, rhonchi or wheezes  CV: regular rates and rhythm, normal S1 S2, no S3 or S4 and no murmur, click or rub -  ABDOMEN:  soft, nontender, no HSM or masses and bowel sounds normal  MS: extremities normal- no gross deformities noted, no evidence of inflammation in joints, FROM in all extremities.  SKIN: no suspicious lesions or rashes  NEURO: Normal strength and tone, sensory exam grossly normal, mentation intact and speech normal  PSYCH: mentation appears normal and affect normal/bright  BACK:no tenderness or pain on straight let raise           (Z23) Need for prophylactic vaccination and inoculation against influenza  (primary encounter diagnosis)  Comment:   Plan: FLU VACCINE, INCREASED ANTIGEN, PRESV FREE, AGE        65+ [13020]            (J44.1) COPD exacerbation (H)  Comment:   Plan: doxycycline (VIBRAMYCIN) 100 MG capsule,         predniSONE (DELTASONE) 20 MG tablet        Two or three times a year, antibiotic and steroids do well for him    (J44.1) Chronic obstructive pulmonary disease with acute exacerbation (H)  Comment:   Plan: budesonide-formoterol (SYMBICORT) 160-4.5         MCG/ACT Inhaler, albuterol (VENTOLIN HFA) 108         (90 BASE) MCG/ACT Inhaler, albuterol (VENTOLIN         HFA) 108 (90 BASE) MCG/ACT Inhaler            (G40.909) Seizure disorder (H)  Comment:   Plan: carBAMazepine (TEGRETOL) 200 MG tablet        No seizures for over five years, meds well tolerated, check labs                                    I have discussed with patient the risks, benefits, medications, treatment options and modalities.   I have instructed the patient to call or schedule a follow-up appointment if  any problems or failure to improve.

## 2018-03-14 NOTE — NURSING NOTE
"Injectable Influenza Immunization Documentation    1.  Has the patient received the information for the injectable influenza vaccine? YES     2. Is the patient 6 months of age or older? YES     3. Does the patient have any of the following contraindications?         Severe allergy to eggs? No     Severe allergic reaction to previous influenza vaccines? No   Severe allergy to latex? No       History of Guillain[place] syndrome? No     Currently have a temperature greater than 100.4F? No        4.  Severely egg allergic patients should have flu vaccine eligibility assessed by an MD, RN, or pharmacist, and those who received flu vaccine should be observed for 15 min by an MD, RN, Pharmacist, Medical Technician, or member of clinic staff.\": YES    5. Latex-allergic patients should be given latex-free influenza vaccine No. Please reference the Vaccine latex table to determine if your clinic s product is latex-containing.       Vaccination given by Roderick Padilla MA      "

## 2018-03-14 NOTE — TELEPHONE ENCOUNTER
"Requested Prescriptions   Pending Prescriptions Disp Refills     carBAMazepine (TEGRETOL) 200 MG tablet [Pharmacy Med Name: CarBAMazepine Oral Tablet 200 MG]    Last Written Prescription Date: 12/20/16  Last Fill Quantity: 180 tablets,  # refills: 3   Last office visit: 7/21/2017 with prescribing provider:  Jodi   Future Office Visit:   Next 5 appointments (look out 90 days)     Mar 14, 2018  3:30 PM CDT   [facility]    [address] [place] [id number] [phone]                180 tablet 2     Sig: TAKE ONE TABLET BY MOUTH TWICE A DAY (MORNING AND EVENING)    Anticonvulsants Protocol  Failed    3/13/2018  6:23 PM       Failed - Review Authorizing provider's last note.     Refer to last progress notes: confirm request is for original authorizing provider (cannot be through other providers).         Failed - Normal CBC on file in past 6 months    Recent Labs   Lab Test  07/21/17   1449   WBC  7.9   RBC  3.66*   HGB  9.3*   HCT  29.9*   PLT  361            Failed - Normal TSH on file in past 6 months    Recent Labs   Lab Test  10/08/13   1224   TSH  4.08             Failed - Carbamazepine level within therapeutic range in last 6 months    No lab results found.    Carbamazepine level must be checked 2-4 weeks after dosage change.           Passed - Recent (6 mo) or future (30 days) visit within the authorizing provider's specialty    Patient had office visit in the last 6 months or has a visit in the next 30 days with authorizing provider or within the authorizing provider's specialty.  See \"Patient Info\" tab in inbasket, or \"Choose Columns\" in Meds & Orders section of the refill encounter.            "

## 2018-05-08 DIAGNOSIS — E78.5 HYPERLIPIDEMIA LDL GOAL <70: ICD-10-CM

## 2018-05-08 NOTE — TELEPHONE ENCOUNTER
"Last Written Prescription Date:  8/16/17  Last Fill Quantity: 90 tablet,  # refills: 2   Last office visit: 3/14/2018 with prescribing provider:  Jodi   Future Office Visit:      Requested Prescriptions   Pending Prescriptions Disp Refills     atorvastatin (LIPITOR) 40 MG tablet [Pharmacy Med Name: Atorvastatin Calcium Oral Tablet 40 MG] 90 tablet 1     Sig: TAKE ONE TABLET BY MOUTH EVERY DAY    Statins Protocol Passed    5/8/2018 11:25 AM       Passed - LDL on file in past 12 months    Recent Labs   Lab Test  07/21/17   1449   LDL  69            Passed - No abnormal creatine kinase in past 12 months    No lab results found.            Passed - Recent (12 mo) or future (30 days) visit within the authorizing provider's specialty    Patient had office visit in the last 12 months or has a visit in the next 30 days with authorizing provider or within the authorizing provider's specialty.  See \"Patient Info\" tab in inbasket, or \"Choose Columns\" in Meds & Orders section of the refill encounter.           Passed - Patient is age 18 or older          "

## 2018-05-09 RX ORDER — ATORVASTATIN CALCIUM 40 MG/1
TABLET, FILM COATED ORAL
Qty: 90 TABLET | Refills: 0 | Status: SHIPPED | OUTPATIENT
Start: 2018-05-09 | End: 2018-08-16

## 2018-05-09 NOTE — TELEPHONE ENCOUNTER
Labs due July  Prescription approved per [facility] Refill Protocol.  Yara Leiva, RN, BSN

## 2018-06-05 DIAGNOSIS — J44.1 CHRONIC OBSTRUCTIVE PULMONARY DISEASE WITH ACUTE EXACERBATION (H): ICD-10-CM

## 2018-06-05 DIAGNOSIS — J44.1 COPD EXACERBATION (H): ICD-10-CM

## 2018-06-05 DIAGNOSIS — I25.10 ASCVD (ARTERIOSCLEROTIC CARDIOVASCULAR DISEASE): ICD-10-CM

## 2018-06-05 NOTE — TELEPHONE ENCOUNTER
"Requested Prescriptions   Pending Prescriptions Disp Refills     ipratropium - albuterol 0.5 mg/2.5 mg/3 mL (DUONEB) 0.5-2.5 (3) MG/3ML neb solution [Pharmacy Med Name: Ipratropium-Albuterol Inhalation Solution 0.5-2.5 (3) MG/3ML] 810 mL 0     Sig: USE 1 VIAL VIA NEBULIZER 4 TIMES DAILY SCHEDULED AND USE EVERY 3 HOURS AS NEEDED FOR SHORTNESS OF BREATH    Short-Acting Beta Agonist Inhalers Protocol  Passed    6/5/2018  7:29 AM       Passed - Patient is age 12 or older       Passed - Recent (12 mo) or future (30 days) visit within the authorizing provider's specialty    Patient had office visit in the last 12 months or has a visit in the next 30 days with authorizing provider or within the authorizing provider's specialty.  See \"Patient Info\" tab in inbasket, or \"Choose Columns\" in Meds & Orders section of the refill encounter.         Last Written Prescription Date:  2/23/18  Last Fill Quantity: 810 mL,  # refills: 0   Last office visit: 3/14/2018 with prescribing provider:  Jodi   Future Office Visit:           clopidogrel (PLAVIX) 75 MG tablet [Pharmacy Med Name: Clopidogrel Bisulfate Oral Tablet 75 MG] 90 tablet 0     Sig: TAKE ONE TABLET BY MOUTH EVERY DAY    Plavix Failed    6/5/2018  7:29 AM       Failed - Normal HGB on file in past 12 months    Recent Labs   Lab Test  07/21/17   1449   HGB  9.3*              Passed - No active PPI on record unless is Protonix       Passed - Normal Platelets on file in past 12 months    Recent Labs   Lab Test  07/21/17   1449   PLT  361              Passed - Recent (12 mo) or future (30 days) visit within the authorizing provider's specialty    Patient had office visit in the last 12 months or has a visit in the next 30 days with authorizing provider or within the authorizing provider's specialty.  See \"Patient Info\" tab in inbasket, or \"Choose Columns\" in Meds & Orders section of the refill encounter.           Passed - Patient is age 18 or older     Last Written " Prescription Date:  12/12/17  Last Fill Quantity: 90 tablet,  # refills: 1   Last office visit: 3/14/2018 with prescribing provider:  Jodi Owens Office Visit:

## 2018-06-07 NOTE — TELEPHONE ENCOUNTER
Routing refill request to provider for review/approval because:  Labs out of range:  Hgb    Rosa Patel RN, BS  Clinical Nurse Triage.

## 2018-06-08 RX ORDER — IPRATROPIUM BROMIDE AND ALBUTEROL SULFATE 2.5; .5 MG/3ML; MG/3ML
SOLUTION RESPIRATORY (INHALATION)
Qty: 810 ML | Refills: 0 | Status: SHIPPED | OUTPATIENT
Start: 2018-06-08 | End: 2018-08-30

## 2018-06-08 RX ORDER — CLOPIDOGREL BISULFATE 75 MG/1
TABLET ORAL
Qty: 90 TABLET | Refills: 0 | Status: SHIPPED | OUTPATIENT
Start: 2018-06-08 | End: 2018-09-13

## 2018-07-05 DIAGNOSIS — I25.10 ASCVD (ARTERIOSCLEROTIC CARDIOVASCULAR DISEASE): ICD-10-CM

## 2018-07-05 NOTE — TELEPHONE ENCOUNTER
"Requested Prescriptions   Pending Prescriptions Disp Refills     lisinopril (PRINIVIL/ZESTRIL) 10 MG tablet [Pharmacy Med Name: Lisinopril Oral Tablet 10 MG] 90 tablet 0    Last Written Prescription Date:  12/12/17  Last Fill Quantity: 90,  # refills: 1   Last Office Visit: 3/14/2018   Future Office Visit:      Sig: TAKE ONE TABLET BY MOUTH EVERY DAY. schedule appointment with doctor in january    ACE Inhibitors (Including Combos) Protocol Passed    7/5/2018 10:34 AM       Passed - Blood pressure under 140/90 in past 12 months    BP Readings from Last 3 Encounters:   03/14/18 138/81   07/21/17 137/76   11/15/16 138/88                Passed - Recent (12 mo) or future (30 days) visit within the authorizing provider's specialty    Patient had office visit in the last 12 months or has a visit in the next 30 days with authorizing provider or within the authorizing provider's specialty.  See \"Patient Info\" tab in inbasket, or \"Choose Columns\" in Meds & Orders section of the refill encounter.           Passed - Patient is age 18 or older       Passed - Normal serum creatinine on file in past 12 months    Recent Labs   Lab Test  07/21/17   1449   CR  0.73            Passed - Normal serum potassium on file in past 12 months    Recent Labs   Lab Test  07/21/17   1449   POTASSIUM  4.0               "

## 2018-07-09 RX ORDER — LISINOPRIL 10 MG/1
TABLET ORAL
Qty: 90 TABLET | Refills: 0 | Status: SHIPPED | OUTPATIENT
Start: 2018-07-09 | End: 2018-10-29

## 2018-08-16 DIAGNOSIS — E78.5 HYPERLIPIDEMIA LDL GOAL <70: ICD-10-CM

## 2018-08-16 DIAGNOSIS — J44.1 COPD EXACERBATION (H): ICD-10-CM

## 2018-08-17 RX ORDER — ATORVASTATIN CALCIUM 40 MG/1
TABLET, FILM COATED ORAL
Qty: 90 TABLET | Refills: 0 | Status: SHIPPED | OUTPATIENT
Start: 2018-08-17 | End: 2018-11-30

## 2018-08-17 RX ORDER — DOXYCYCLINE 100 MG/1
CAPSULE ORAL
Qty: 20 CAPSULE | Refills: 2 | Status: SHIPPED | OUTPATIENT
Start: 2018-08-17

## 2018-08-17 RX ORDER — PREDNISONE 20 MG/1
TABLET ORAL
Qty: 7 TABLET | Refills: 2 | Status: SHIPPED | OUTPATIENT
Start: 2018-08-17

## 2018-08-17 NOTE — TELEPHONE ENCOUNTER
"Requested Prescriptions   Pending Prescriptions Disp Refills     atorvastatin (LIPITOR) 40 MG tablet [Pharmacy Med Name: Atorvastatin Calcium Oral Tablet 40 MG] 90 tablet 0    Last Written Prescription Date:  5/9/18  Last Fill Quantity: 90,  # refills: 0   Last Office Visit: 3/14/2018   Future Office Visit:      Sig: TAKE ONE TABLET BY MOUTH DAILY    Statins Protocol Failed    8/16/2018 12:17 PM       Failed - LDL on file in past 12 months    Recent Labs   Lab Test  07/21/17   1449   LDL  69            Passed - No abnormal creatine kinase in past 12 months    No lab results found.            Passed - Recent (12 mo) or future (30 days) visit within the authorizing provider's specialty    Patient had office visit in the last 12 months or has a visit in the next 30 days with authorizing provider or within the authorizing provider's specialty.  See \"Patient Info\" tab in inbasket, or \"Choose Columns\" in Meds & Orders section of the refill encounter.           Passed - Patient is age 18 or older   _________________________________________________________________________________________________________________________       doxycycline (VIBRAMYCIN) 100 MG capsule [Pharmacy Med Name: Doxycycline Hyclate Oral Capsule 100 MG] 20 capsule 2     Sig: Take 1 capsule (100 mg) by mouth 2 times daily    There is no refill protocol information for this order   Last Written Prescription Date:  3/14/18  Last Fill Quantity: 20,  # refills: 3   Last Office Visit: 3/14/2018   Future Office Visit:       Associated Diagnoses   COPD exacerbation (H) [J44.1]           _________________________________________________________________________________________________________________________       predniSONE (DELTASONE) 20 MG tablet [Pharmacy Med Name: PredniSONE Oral Tablet 20 MG] 7 tablet 2     Sig: TAKE ONE TABLET BY MOUTH EVERY DAY    There is no refill protocol information for this order        Last Written Prescription Date:  3/14/18  Last " Fill Quantity: 7,  # refills: 3   Last Office Visit: 3/14/2018   Future Office Visit:       Associated Diagnoses   COPD exacerbation (H) [J44.1]

## 2018-08-17 NOTE — TELEPHONE ENCOUNTER
Routing refill request to provider for review/approval because:  Labs not current:  Lipids  Leonie Newby RN, BSN

## 2018-08-30 DIAGNOSIS — J44.1 COPD EXACERBATION (H): ICD-10-CM

## 2018-08-30 DIAGNOSIS — J44.1 CHRONIC OBSTRUCTIVE PULMONARY DISEASE WITH ACUTE EXACERBATION (H): ICD-10-CM

## 2018-08-30 NOTE — TELEPHONE ENCOUNTER
"Requested Prescriptions   Pending Prescriptions Disp Refills     ipratropium - albuterol 0.5 mg/2.5 mg/3 mL (DUONEB) 0.5-2.5 (3) MG/3ML neb solution [Pharmacy Med Name: Ipratropium-Albuterol Inhalation Solution 0.5-2.5 (3) MG/3ML] 810 mL 0    Last Written Prescription Date:  6/8/18  Last Fill Quantity: 810mL ,  # refills: 0   Last Office Visit: 3/14/2018   Future Office Visit:      Sig: USE 1 VIAL VIA NEBULIZER 4 TIMES DAILY SCHEDULED AND USE EVERY 3 HOURS AS NEEDED FOR SHORTNESS OF BREATH    Short-Acting Beta Agonist Inhalers Protocol  Passed    8/30/2018 11:18 AM       Passed - Patient is age 12 or older       Passed - Recent (12 mo) or future (30 days) visit within the authorizing provider's specialty    Patient had office visit in the last 12 months or has a visit in the next 30 days with authorizing provider or within the authorizing provider's specialty.  See \"Patient Info\" tab in inbasket, or \"Choose Columns\" in Meds & Orders section of the refill encounter.              "

## 2018-09-04 RX ORDER — IPRATROPIUM BROMIDE AND ALBUTEROL SULFATE 2.5; .5 MG/3ML; MG/3ML
SOLUTION RESPIRATORY (INHALATION)
Qty: 810 ML | Refills: 0 | Status: SHIPPED | OUTPATIENT
Start: 2018-09-04 | End: 2018-12-19

## 2018-09-04 NOTE — TELEPHONE ENCOUNTER
Prescription approved per [facility] Refill Protocol.  Yara Leiva RN, BSN

## 2018-09-13 DIAGNOSIS — I25.10 ASCVD (ARTERIOSCLEROTIC CARDIOVASCULAR DISEASE): ICD-10-CM

## 2018-09-13 NOTE — TELEPHONE ENCOUNTER
"Requested Prescriptions   Pending Prescriptions Disp Refills     clopidogrel (PLAVIX) 75 MG tablet [Pharmacy Med Name: Clopidogrel Bisulfate Oral Tablet 75 MG] 90 tablet 0    Last Written Prescription Date:  6/8/18  Last Fill Quantity: 90,  # refills: 0   Last Office Visit: 3/14/2018   Future Office Visit:      Sig: TAKE ONE TABLET BY MOUTH DAILY    Plavix Failed    9/13/2018  9:03 AM       Failed - Normal HGB on file in past 12 months    Recent Labs   Lab Test  07/21/17   1449   HGB  9.3*              Failed - Normal Platelets on file in past 12 months    Recent Labs   Lab Test  07/21/17   1449   PLT  361              Passed - No active PPI on record unless is Protonix       Passed - Recent (12 mo) or future (30 days) visit within the authorizing provider's specialty    Patient had office visit in the last 12 months or has a visit in the next 30 days with authorizing provider or within the authorizing provider's specialty.  See \"Patient Info\" tab in inbasket, or \"Choose Columns\" in Meds & Orders section of the refill encounter.           Passed - Patient is age 18 or older          "

## 2018-09-13 NOTE — LETTER
September 14, 2018      Abhijeet De Oliveira  [address]   [facility] [address]        Dear Abhijeet,     We recently received a call from your pharmacy requesting a refill of your medication (clopidogrel).    A review of your chart indicates that an appointment is required.  Please contact our office at [phone] to schedule your doctor's appointment. Bob Zapata MD also needs to recheck your hemoglobin. He will order your labs at the visit.     We have authorized one refill of your medication to allow time for you to schedule your appointment.    Taking care of your health is important to us and ongoing visits with your provider are vital to your care.  We look forward to seeing you in the near future.    Sincerely,    Bob Zapata MD/Yara DEL TORO

## 2018-09-14 RX ORDER — CLOPIDOGREL BISULFATE 75 MG/1
TABLET ORAL
Qty: 30 TABLET | Refills: 0 | Status: SHIPPED | OUTPATIENT
Start: 2018-09-14 | End: 2018-10-29

## 2018-09-14 NOTE — TELEPHONE ENCOUNTER
Entered by Bob Zapata MD at 7/24/2017  6:04 PM   Read by Abhijeet De Oliveira at 7/24/2017  6:21 PM   He wants to try iron for his hemoglobin and come back in a month.     Hemoglobin   Date Value Ref Range Status   07/21/2017 9.3 (L) 13.3 - 17.7 g/dL Final     Comment:     Results confirmed by repeat test   ]  No f/u appointment made, send #30, appointment letter sent  Prescription approved per [facility] Refill Protocol.  Yara Leiva RN, BSN

## 2018-10-26 DIAGNOSIS — I25.10 ASCVD (ARTERIOSCLEROTIC CARDIOVASCULAR DISEASE): ICD-10-CM

## 2018-10-27 NOTE — TELEPHONE ENCOUNTER
"Requested Prescriptions   Pending Prescriptions Disp Refills     lisinopril (PRINIVIL/ZESTRIL) 10 MG tablet [Pharmacy Med Name: Lisinopril Oral Tablet 10 MG]  Last Written Prescription Date:  7/9/2018  Last Fill Quantity: 90 tablet,  # refills: 0   Last office visit: 3/14/2018 with prescribing provider:  Jodi      90 tablet 0     Sig: TAKE ONE TABLET BY MOUTH DAILY    ACE Inhibitors (Including Combos) Protocol Failed    10/26/2018  3:52 PM       Failed - Normal serum creatinine on file in past 12 months    Recent Labs   Lab Test  07/21/17   1449   CR  0.73            Failed - Normal serum potassium on file in past 12 months    Recent Labs   Lab Test  07/21/17   1449   POTASSIUM  4.0            Passed - Blood pressure under 140/90 in past 12 months    BP Readings from Last 3 Encounters:   03/14/18 138/81   07/21/17 137/76   11/15/16 138/88                Passed - Recent (12 mo) or future (30 days) visit within the authorizing provider's specialty    Patient had office visit in the last 12 months or has a visit in the next 30 days with authorizing provider or within the authorizing provider's specialty.  See \"Patient Info\" tab in inbasket, or \"Choose Columns\" in Meds & Orders section of the refill encounter.             Passed - Patient is age 18 or older        clopidogrel (PLAVIX) 75 MG tablet [Pharmacy Med Name: Clopidogrel Bisulfate Oral Tablet 75 MG]  Last Written Prescription Date:  9/14/2018  Last Fill Quantity: 30 tablet,  # refills: 0   Last office visit: 3/14/2018 with prescribing provider:  Diaz      30 tablet 0     Sig: TAKE ONE TABLET BY MOUTH EVERY DAY    Plavix Failed    10/26/2018  3:52 PM       Failed - Normal HGB on file in past 12 months    Recent Labs   Lab Test  07/21/17   1449   HGB  9.3*              Failed - Normal Platelets on file in past 12 months    Recent Labs   Lab Test  07/21/17   1449   PLT  361              Passed - No active PPI on record unless is Protonix       Passed - Recent " "(12 mo) or future (30 days) visit within the authorizing provider's specialty    Patient had office visit in the last 12 months or has a visit in the next 30 days with authorizing provider or within the authorizing provider's specialty.  See \"Patient Info\" tab in inbasket, or \"Choose Columns\" in Meds & Orders section of the refill encounter.             Passed - Patient is age 18 or older          "

## 2018-10-29 ENCOUNTER — TELEPHONE (OUTPATIENT)
Dept: FAMILY MEDICINE | Facility: CLINIC | Age: 73
End: 2018-10-29

## 2018-10-29 ENCOUNTER — MYC REFILL (OUTPATIENT)
Dept: FAMILY MEDICINE | Facility: CLINIC | Age: 73
End: 2018-10-29

## 2018-10-29 DIAGNOSIS — I25.10 ASCVD (ARTERIOSCLEROTIC CARDIOVASCULAR DISEASE): ICD-10-CM

## 2018-10-29 RX ORDER — LISINOPRIL 10 MG/1
40 TABLET ORAL DAILY
Qty: 30 TABLET | Refills: 0 | Status: SHIPPED | OUTPATIENT
Start: 2018-10-29 | End: 2018-10-29

## 2018-10-29 RX ORDER — LISINOPRIL 10 MG/1
10 TABLET ORAL DAILY
Qty: 30 TABLET | Refills: 0 | Status: SHIPPED | OUTPATIENT
Start: 2018-10-29 | End: 2018-11-30

## 2018-10-29 RX ORDER — CLOPIDOGREL BISULFATE 75 MG/1
TABLET ORAL
Qty: 30 TABLET | Refills: 0 | COMMUNITY
Start: 2018-10-29 | End: 2019-01-08

## 2018-10-29 RX ORDER — CLOPIDOGREL BISULFATE 75 MG/1
75 TABLET ORAL DAILY
Qty: 30 TABLET | Refills: 0 | Status: SHIPPED | OUTPATIENT
Start: 2018-10-29 | End: 2018-11-30

## 2018-10-29 RX ORDER — LISINOPRIL 10 MG/1
TABLET ORAL
Qty: 90 TABLET | Refills: 0 | COMMUNITY
Start: 2018-10-29 | End: 2019-01-08

## 2018-10-29 NOTE — TELEPHONE ENCOUNTER
Message from [employer]:  Original authorizing provider: Bob Zapata MD    Abhijeet De Oliveira would like a refill of the following medications:  lisinopril (PRINIVIL/ZESTRIL) 10 MG tablet [Bob Zapata MD]  clopidogrel (PLAVIX) 75 MG tablet [Bob Zapata MD]    Preferred pharmacy: [id number] [address]    Comment:  I received a letter stating I would need to see Jodi Johnson before you will refill my prescription for clopidogrel. I visited the doctor on March 14, 2018 and reviewed all of my medications specifically so I wouldn't have to go in more than approx. once per year. All of my meds are maintenance meds that I have been taking for many years. I reordered the two medications shown here from my pharmacy so you should get that request today, so please refill these.

## 2018-10-29 NOTE — TELEPHONE ENCOUNTER
Pending Prescriptions:                       Disp   Refills    lisinopril (PRINIVIL/ZESTRIL) 10 MG [id number] tab*0            Sig: Take 4 tablets (40 mg) by mouth daily    [facility] asks 10 mg or 40 mg daily?  Fabian Garland, RN

## 2018-10-29 NOTE — TELEPHONE ENCOUNTER
Bob Zapata MD, see [employer] message, pt does not want to see you, overdue for annual lipids/labs (due in July) also f/u hgb, ? Lab only, inform pt of plan via [employer]    Last office visit: 3/14/2018 with prescribing provider:     Future Office Visit:        Hemoglobin   Date Value Ref Range Status   07/21/2017 9.3 (L) 13.3 - 17.7 g/dL Final     Comment:     Results confirmed by repeat test   ]  Yara Leiva RN, BSN  Message handled by Nurse Triage.

## 2018-11-30 ENCOUNTER — MYC MEDICAL ADVICE (OUTPATIENT)
Dept: FAMILY MEDICINE | Facility: CLINIC | Age: 73
End: 2018-11-30

## 2018-11-30 DIAGNOSIS — I25.10 ASCVD (ARTERIOSCLEROTIC CARDIOVASCULAR DISEASE): ICD-10-CM

## 2018-11-30 DIAGNOSIS — E78.5 HYPERLIPIDEMIA LDL GOAL <70: ICD-10-CM

## 2018-11-30 RX ORDER — CLOPIDOGREL BISULFATE 75 MG/1
TABLET ORAL
Qty: 30 TABLET | Refills: 0 | Status: SHIPPED | OUTPATIENT
Start: 2018-11-30 | End: 2019-01-08

## 2018-11-30 RX ORDER — ATORVASTATIN CALCIUM 40 MG/1
TABLET, FILM COATED ORAL
Qty: 30 TABLET | Refills: 0 | Status: SHIPPED | OUTPATIENT
Start: 2018-11-30 | End: 2019-01-08

## 2018-11-30 RX ORDER — LISINOPRIL 10 MG/1
TABLET ORAL
Qty: 30 TABLET | Refills: 0 | Status: SHIPPED | OUTPATIENT
Start: 2018-11-30 | End: 2019-01-08

## 2018-11-30 NOTE — TELEPHONE ENCOUNTER
"See Mychart message  Last office visit: 3/14/2018 with prescribing provider:     Future Office Visit:   Next 5 appointments (look out 90 days)     Dec 05, 2018  2:30 PM CST   [facility]ata MD   [facility]    [address] [place] [address] [phone]                 Requested Prescriptions   Pending Prescriptions Disp Refills     lisinopril (PRINIVIL/ZESTRIL) 10 MG tablet [Pharmacy Med Name: Lisinopril Oral Tablet 10 MG] 30 tablet 0     Sig: TAKE ONE TABLET BY MOUTH EVERY DAY    ACE Inhibitors (Including Combos) Protocol Failed    11/30/2018  1:45 PM       Failed - Normal serum creatinine on file in past 12 months    Recent Labs   Lab Test  07/21/17   1449   CR  0.73            Failed - Normal serum potassium on file in past 12 months    Recent Labs   Lab Test  07/21/17   1449   POTASSIUM  4.0            Passed - Blood pressure under 140/90 in past 12 months    BP Readings from Last 3 Encounters:   03/14/18 138/81   07/21/17 137/76   11/15/16 138/88                Passed - Recent (12 mo) or future (30 days) visit within the authorizing provider's specialty    Patient had office visit in the last 12 months or has a visit in the next 30 days with authorizing provider or within the authorizing provider's specialty.  See \"Patient Info\" tab in inbasket, or \"Choose Columns\" in Meds & Orders section of the refill encounter.             Passed - Patient is age 18 or older        atorvastatin (LIPITOR) 40 MG tablet [Pharmacy Med Name: Atorvastatin Calcium Oral Tablet 40 MG] 90 tablet 0     Sig: TAKE ONE TABLET BY MOUTH EVERY DAY    Statins Protocol Failed    11/30/2018  1:45 PM       Failed - LDL on file in past 12 months    Recent Labs   Lab Test  07/21/17   1449   LDL  69            Passed - No abnormal creatine kinase in past 12 months    No lab results found.            Passed - Recent (12 mo) or future (30 days) visit within " "the authorizing provider's specialty    Patient had office visit in the last 12 months or has a visit in the next 30 days with authorizing provider or within the authorizing provider's specialty.  See \"Patient Info\" tab in inbasket, or \"Choose Columns\" in Meds & Orders section of the refill encounter.             Passed - Patient is age 18 or older        clopidogrel (PLAVIX) 75 MG tablet [Pharmacy Med Name: Clopidogrel Bisulfate Oral Tablet 75 MG] 30 tablet 0     Sig: TAKE ONE TABLET BY MOUTH EVERY DAY    Plavix Failed    11/30/2018  1:45 PM       Failed - Normal HGB on file in past 12 months    Recent Labs   Lab Test  07/21/17   1449   HGB  9.3*              Failed - Normal Platelets on file in past 12 months    Recent Labs   Lab Test  07/21/17   1449   PLT  361              Passed - No active PPI on record unless is Protonix       Passed - Recent (12 mo) or future (30 days) visit within the authorizing provider's specialty    Patient had office visit in the last 12 months or has a visit in the next 30 days with authorizing provider or within the authorizing provider's specialty.  See \"Patient Info\" tab in inbasket, or \"Choose Columns\" in Meds & Orders section of the refill encounter.             Passed - Patient is age 18 or older        Prescription approved per [facility] Refill Protocol.  Yara Leiva RN, BSN    "

## 2018-11-30 NOTE — TELEPHONE ENCOUNTER
See refill encounter, sent mychart response  Prescription approved per [facility] Refill Protocol.  Yara Leiva, RN, BSN

## 2019-01-08 ENCOUNTER — OFFICE VISIT (OUTPATIENT)
Dept: FAMILY MEDICINE | Facility: CLINIC | Age: 74
End: 2019-01-08
Payer: MEDICARE

## 2019-01-08 VITALS
TEMPERATURE: 98.1 F | HEART RATE: 100 BPM | OXYGEN SATURATION: 92 % | DIASTOLIC BLOOD PRESSURE: 74 MMHG | WEIGHT: 128 LBS | BODY MASS INDEX: 19.46 KG/M2 | SYSTOLIC BLOOD PRESSURE: 138 MMHG

## 2019-01-08 DIAGNOSIS — D63.8 ANEMIA IN OTHER CHRONIC DISEASES CLASSIFIED ELSEWHERE: ICD-10-CM

## 2019-01-08 DIAGNOSIS — I25.10 ASCVD (ARTERIOSCLEROTIC CARDIOVASCULAR DISEASE): ICD-10-CM

## 2019-01-08 DIAGNOSIS — Z23 NEED FOR PROPHYLACTIC VACCINATION AND INOCULATION AGAINST INFLUENZA: ICD-10-CM

## 2019-01-08 DIAGNOSIS — J44.9 STAGE 3 SEVERE COPD BY GOLD CLASSIFICATION (H): Primary | ICD-10-CM

## 2019-01-08 DIAGNOSIS — E78.5 HYPERLIPIDEMIA LDL GOAL <70: ICD-10-CM

## 2019-01-08 LAB
ANISOCYTOSIS BLD QL SMEAR: SLIGHT
BASOPHILS # BLD AUTO: 0.1 10E9/L (ref 0–0.2)
BASOPHILS NFR BLD AUTO: 1 %
DIFFERENTIAL METHOD BLD: ABNORMAL
EOSINOPHIL # BLD AUTO: 0.1 10E9/L (ref 0–0.7)
EOSINOPHIL NFR BLD AUTO: 1 %
ERYTHROCYTE [DISTWIDTH] IN BLOOD BY AUTOMATED COUNT: 19.5 % (ref 10–15)
ERYTHROCYTE [DISTWIDTH] IN BLOOD BY AUTOMATED COUNT: 19.5 % (ref 10–15)
HCT VFR BLD AUTO: 23.7 % (ref 40–53)
HCT VFR BLD AUTO: 23.7 % (ref 40–53)
HGB BLD-MCNC: 6.8 G/DL (ref 13.3–17.7)
HGB BLD-MCNC: 6.8 G/DL (ref 13.3–17.7)
HYPOCHROMIA BLD QL: PRESENT
LYMPHOCYTES # BLD AUTO: 0.9 10E9/L (ref 0.8–5.3)
LYMPHOCYTES NFR BLD AUTO: 13 %
MCH RBC QN AUTO: 20.3 PG (ref 26.5–33)
MCH RBC QN AUTO: 20.3 PG (ref 26.5–33)
MCHC RBC AUTO-ENTMCNC: 28.7 G/DL (ref 31.5–36.5)
MCHC RBC AUTO-ENTMCNC: 28.7 G/DL (ref 31.5–36.5)
MCV RBC AUTO: 71 FL (ref 78–100)
MCV RBC AUTO: 71 FL (ref 78–100)
MICROCYTES BLD QL SMEAR: PRESENT
MONOCYTES # BLD AUTO: 0.8 10E9/L (ref 0–1.3)
MONOCYTES NFR BLD AUTO: 11 %
NEUTROPHILS # BLD AUTO: 5.1 10E9/L (ref 1.6–8.3)
NEUTROPHILS NFR BLD AUTO: 74 %
PLATELET # BLD AUTO: 336 10E9/L (ref 150–450)
PLATELET # BLD AUTO: 336 10E9/L (ref 150–450)
PLATELET # BLD EST: ABNORMAL 10*3/UL
RBC # BLD AUTO: 3.35 10E12/L (ref 4.4–5.9)
RBC # BLD AUTO: 3.35 10E12/L (ref 4.4–5.9)
RBC INCLUSIONS BLD: SLIGHT
RETICS # AUTO: 48.9 10E9/L (ref 25–95)
RETICS/RBC NFR AUTO: 1.4 % (ref 0.5–2)
WBC # BLD AUTO: 7 10E9/L (ref 4–11)
WBC # BLD AUTO: 7 10E9/L (ref 4–11)

## 2019-01-08 PROCEDURE — 90662 IIV NO PRSV INCREASED AG IM: CPT | Performed by: FAMILY MEDICINE

## 2019-01-08 PROCEDURE — 85045 AUTOMATED RETICULOCYTE COUNT: CPT | Performed by: FAMILY MEDICINE

## 2019-01-08 PROCEDURE — 80053 COMPREHEN METABOLIC PANEL: CPT | Performed by: FAMILY MEDICINE

## 2019-01-08 PROCEDURE — 40000847 ZZHCL STATISTIC MORPHOLOGY W/INTERP HISTOLOGY TC 85060: Performed by: FAMILY MEDICINE

## 2019-01-08 PROCEDURE — 36415 COLL VENOUS BLD VENIPUNCTURE: CPT | Performed by: FAMILY MEDICINE

## 2019-01-08 PROCEDURE — G0008 ADMIN INFLUENZA VIRUS VAC: HCPCS | Performed by: FAMILY MEDICINE

## 2019-01-08 PROCEDURE — 85060 BLOOD SMEAR INTERPRETATION: CPT | Performed by: FAMILY MEDICINE

## 2019-01-08 PROCEDURE — 85025 COMPLETE CBC W/AUTO DIFF WBC: CPT | Performed by: FAMILY MEDICINE

## 2019-01-08 PROCEDURE — 99215 OFFICE O/P EST HI 40 MIN: CPT | Mod: 25 | Performed by: FAMILY MEDICINE

## 2019-01-08 PROCEDURE — 80061 LIPID PANEL: CPT | Performed by: FAMILY MEDICINE

## 2019-01-08 RX ORDER — BUDESONIDE AND FORMOTEROL FUMARATE DIHYDRATE 160; 4.5 UG/1; UG/1
AEROSOL RESPIRATORY (INHALATION)
Qty: 3 INHALER | Refills: 3 | Status: SHIPPED | OUTPATIENT
Start: 2019-01-08

## 2019-01-08 RX ORDER — ALBUTEROL SULFATE 90 UG/1
1-2 AEROSOL, METERED RESPIRATORY (INHALATION) EVERY 4 HOURS PRN
Qty: 3 INHALER | Refills: 3 | Status: SHIPPED | OUTPATIENT
Start: 2019-01-08

## 2019-01-08 RX ORDER — LISINOPRIL 10 MG/1
10 TABLET ORAL DAILY
Qty: 90 TABLET | Refills: 3 | Status: SHIPPED | OUTPATIENT
Start: 2019-01-08

## 2019-01-08 RX ORDER — CLOPIDOGREL BISULFATE 75 MG/1
75 TABLET ORAL DAILY
Qty: 90 TABLET | Refills: 3 | Status: SHIPPED | OUTPATIENT
Start: 2019-01-08

## 2019-01-08 RX ORDER — ATORVASTATIN CALCIUM 40 MG/1
40 TABLET, FILM COATED ORAL DAILY
Qty: 90 TABLET | Refills: 0 | Status: SHIPPED | OUTPATIENT
Start: 2019-01-08

## 2019-01-08 NOTE — LETTER
My COPD Action Plan     Name: Abhijeet De Oliveira    YOB: 1945   Date: 1/8/2019    My doctor: Bob Zapata MD   My clinic: [address] [address] [phone] Current Outpatient Medications   Medication     albuterol (PROAIR HFA/PROVENTIL HFA/VENTOLIN HFA) 108 (90 Base) MCG/ACT inhaler     atorvastatin (LIPITOR) 40 MG tablet     budesonide-formoterol (SYMBICORT) 160-4.5 MCG/ACT Inhaler     carBAMazepine (TEGRETOL) 200 MG tablet     clopidogrel (PLAVIX) 75 MG tablet     doxycycline (VIBRAMYCIN) 100 MG capsule     ferrous gluconate (FERGON) 324 (38 FE) MG tablet     ipratropium - albuterol 0.5 mg/2.5 mg/3 mL (DUONEB) 0.5-2.5 (3) MG/3ML neb solution     lisinopril (PRINIVIL/ZESTRIL) 10 MG tablet     Multiple Vitamins-Minerals (CENTRUM SILVER) per tablet     nitroglycerin (NITROSTAT) 0.4 MG SL tablet     order for DME     ORDER FOR DME     predniSONE (DELTASONE) 20 MG tablet     No current facility-administered medications for this visit.           My COPD Severity: Severe = FeV1 < 30%-49%      Use of Oxygen: 2 Liters at night     Make sure you've had your pneumonia   vaccines.          GREEN ZONE       Doing well today      Usual level of activity and exercise    Usual amount of cough and mucus    No shortness of breath    Usual level of health (thinking clearly, sleeping well, feel like eating) Actions:      Take daily medicines    Use oxygen as prescribed    Follow regular exercise and diet plan    Avoid cigarette smoke and other irritants that harm the lungs           YELLOW ZONE          Having a bad day or flare up      Short of breath more than usual    A lot more sputum (mucus) than usual    Sputum looks yellow, green, tan, brown or bloody    More coughing or wheezing    Fever or chills    Less energy; trouble completing activities    Trouble thinking or focusing    Using quick relief inhaler or nebulizer more  often    Poor sleep; symptoms wake me up    Do not feel like eating Actions:      Get plenty of rest    Take daily medicines    Use quick relief inhaler every 6 hours    If you use oxygen, call you doctor to see if you should adjust your oxygen    Do breathing exercises or other things to help you relax    Let a loved one, friend or neighbor know you are feeling worse    Call your care team if you have 2 or more symptoms.  Start taking steroids or antibiotics if directed by your care team           RED ZONE       Need medical care now      Severe shortness of breath (feel you can't breathe)    Fever, chills    Not enough breath to do any activity    Trouble coughing up mucus, walking or talking    Blood in mucus    Frequent coughing   Rescue medicines are not working    Not able to sleep because of breathing    Feel confused or drowsy    Chest pain    Actions:      Call your health care team.  If you cannot reach your care team, call 911 or go to the emergency room.        Annual Reminders:  Meet with Care Team, Flu Shot every Fall  Pharmacy:    WRITTEN PRESCRIPTION REQUESTED  [facility] [address] [place], [place] - [address] [facility] & [address] [place] [address] PHARMACY [id number] - [place], [place] - [address]  [id number] [place], [place] - [address]  [id number] [place], [place] - [address]

## 2019-01-08 NOTE — LETTER
January 11, 2019      Abhijeet De Oliveira  [address]   [facility] [address]        Dear ,    We are writing to inform you of your test results.    Your test results fall within the expected range(s) or remain unchanged from previous results.  Please continue with current treatment plan.    Resulted Orders   Lipid panel reflex to direct LDL Fasting   Result Value Ref Range    Cholesterol 167 <200 mg/dL    Triglycerides 72 <150 mg/dL      Comment:      Fasting specimen    HDL Cholesterol 75 >39 mg/dL    LDL Cholesterol Calculated 78 <100 mg/dL      Comment:      Desirable:       <100 mg/dl    Non HDL Cholesterol 92 <130 mg/dL   Comprehensive metabolic panel   Result Value Ref Range    Sodium 141 133 - 144 mmol/L    Potassium 3.0 (L) 3.4 - 5.3 mmol/L    Chloride 103 94 - 109 mmol/L    Carbon Dioxide 28 20 - 32 mmol/L    Anion Gap 10 3 - 14 mmol/L    Glucose 88 70 - 99 mg/dL      Comment:      Fasting specimen    Urea Nitrogen 12 7 - 30 mg/dL    Creatinine 0.75 0.66 - 1.25 mg/dL    GFR Estimate 90 >60 mL/min/[1.73_m2]      Comment:      Non  GFR Calc  Starting 12/18/2018, serum creatinine based estimated GFR (eGFR) will be   calculated using the Chronic Kidney Disease Epidemiology Collaboration   (CKD-EPI) equation.      GFR Estimate If Black >90 >60 mL/min/[1.73_m2]      Comment:       GFR Calc  Starting 12/18/2018, serum creatinine based estimated GFR (eGFR) will be   calculated using the Chronic Kidney Disease Epidemiology Collaboration   (CKD-EPI) equation.      Calcium 8.8 8.5 - 10.1 mg/dL    Bilirubin Total 0.6 0.2 - 1.3 mg/dL    Albumin 3.1 (L) 3.4 - 5.0 g/dL    Protein Total 7.1 6.8 - 8.8 g/dL    Alkaline Phosphatase 104 40 - 150 U/L    ALT 15 0 - 70 U/L    AST 18 0 - 45 U/L   CBC with platelets   Result Value Ref Range    WBC 7.0 4.0 - 11.0 10e9/L    RBC Count 3.35 (L) 4.4 - 5.9 10e12/L      Comment:      Results confirmed by repeat test     Hemoglobin 6.8 (LL) 13.3 - 17.7 g/dL      Comment:      Results confirmed by repeat test  Critical Value called to and read back by  DR MUKESH GERARD AT 1059 ON [id number]      Hematocrit 23.7 (L) 40.0 - 53.0 %      Comment:      Results confirmed by repeat test    MCV 71 (L) 78 - 100 fl      Comment:      Results confirmed by repeat test    MCH 20.3 (L) 26.5 - 33.0 pg      Comment:      Results confirmed by repeat test    MCHC 28.7 (L) 31.5 - 36.5 g/dL      Comment:      Results confirmed by repeat test    RDW 19.5 (H) 10.0 - 15.0 %      Comment:      Results confirmed by repeat test    Platelet Count 336 150 - 450 10e9/L   Blood Morphology Pathologist Review   Result Value Ref Range    Copath Report       Patient Name: BISHOP SHINE  MR#: [id number]  Specimen #: [id number]  Collected: 1/8/2019  Received: 1/9/2019  Reported: 1/9/2019 08:48  Ordering Phy(s): MUKESH POOL    For improved result formatting, select 'View Enhanced Report Format' under   Linked Documents section.    TEST(S):  Peripheral Smear Morphology    FINAL DIAGNOSIS:  Peripheral blood.  - Hypochromic microcytic anemia.    COMMENT:  The hypochromic microcytic anemia could indicate iron deficiency in the   proper clinical setting due to blood  loss, nutritional deficiency, malabsorption, etc.  because there are a   small number of spherocytes, low grade  hemolysis cannot be excluded.  An anemia of mixed etiology cannot be   excluded.  Clinical correlation is  required.    Electronically signed out by:    Michael Celeste M.D.    CLINICAL HISTORY:  Anemia, has COPD.    PERIPHERAL BLOOD DATA:    PERIPHERAL BLOOD DATA  Patient Value (Reference Range >18 year old male)  7.0 .......WBC (4.0-11.0 x 10*9/L)  3.35  .......RBC (4.4-5.9 x 10*12/L)  6.8 .......HGB (13.3-17.7 g/dL)  23.7 .......HCT (40.0-53.0 %)  71 .......MCV (78-100fL)  20.3 .......MCH (26.5-33.0 pg)  28.7 .......MCHC (31.5-36.5 g/dL)  19.5 .......RDW (10.0-15.0 %)  336  .......PLT (150-450 x 10*9/L)  1.4 .......RETIC (0.5-2.0%)    PERIPHERAL BLOOD DIFFERENTIAL  (Reference ranges >18 year old)    Percent  74....Neutrophils, segmented and bands (40 - 75)  13....Lymphocytes (20 - 48)  11....Monocytes (0 - 12)  1....Eosinophils (0 - 6)  1....Basophils (0 - 2)    Absolute  5.1....Neutrophils, segmented and bands  (1.6 - 8.3 x 10*9/L)  0.9....Lymphocytes  (0.8 - 5.3 x 10*9/L)  0.8....Monocytes  (0 -1.3 x 10*9/L)  0.1....Eosinophils  (0 - 0.7 x 10*9/L)  0.1....Basophils  (0 - 0.2 x 10*9/L)    PERIPHERAL MORPHOLOGY:    ERYTHROCYTES: Appear hypochromic and show mild anisopoikilocytosis.  There   are scattered target forms and  elliptocytes.  No significant fragmentation is seen.  Some of the cells   appear dimorphic and there are  scattere d spherocytes.    LEUKOCYTES: Granulocytes appear predominantly mature and segmented without   significant left shift.  Lymphocytes are small.  Monocytes appear mature.  No obvious dysplastic   changes are seen.  No blasts are  identified.    PLATELETS: Appear normal in number.    CPT Codes:  A: 56622-[facility]    TESTING LAB LOCATION:  [facility] [address] [place]  [id number] [phone]    COLLECTION SITE:  Client:  [facility]  Location:  [facility] (R)     CBC with platelets differential   Result Value Ref Range    WBC 7.0 4.0 - 11.0 10e9/L    RBC Count 3.35 (L) 4.4 - 5.9 10e12/L      Comment:      Results confirmed by repeat test    Hemoglobin 6.8 (LL) 13.3 - 17.7 g/dL      Comment:      Results confirmed by repeat test  Critical Value called to and read back by  MUKESH POOL MD ON 1.8.19 AT 1059 BY CL      Hematocrit 23.7 (L) 40.0 - 53.0 %      Comment:      Results confirmed by repeat test    MCV 71 (L) 78 - 100 fl      Comment:      Results confirmed by repeat test    MCH 20.3 (L) 26.5 - 33.0 pg      Comment:      Results confirmed by repeat test    MCHC 28.7 (L) 31.5 - 36.5 g/dL      Comment:      Results  confirmed by repeat test    RDW 19.5 (H) 10.0 - 15.0 %      Comment:      Results confirmed by repeat test    Platelet Count 336 150 - 450 10e9/L    % Neutrophils 74.0 %    % Lymphocytes 13.0 %    % Monocytes 11.0 %    % Eosinophils 1.0 %    % Basophils 1.0 %    Absolute Neutrophil 5.1 1.6 - 8.3 10e9/L    Absolute Lymphocytes 0.9 0.8 - 5.3 10e9/L    Absolute Monocytes 0.8 0.0 - 1.3 10e9/L    Absolute Eosinophils 0.1 0.0 - 0.7 10e9/L    Absolute Basophils 0.1 0.0 - 0.2 10e9/L    Anisocytosis Slight     RBC Fragments Slight     Microcytes Present     Hypochromasia Present     Platelet Estimate       Automated count confirmed.  Platelet morphology is normal.    Diff Method Automated Method    Reticulocyte Count   Result Value Ref Range    % Retic 1.4 0.5 - 2.0 %    Absolute Retic 48.9 25 - 95 10e9/L       If you have any questions or concerns, please call the clinic at the number listed above.       Sincerely,        Bob Zapata MD

## 2019-01-08 NOTE — LETTER
January 11, 2019      Abhijeet De Oliveira  [address]   [facility] [address]        Dear ,    We are writing to inform you of your test results.    Your test results fall within the expected range(s) or remain unchanged from previous results.  Please continue with current treatment plan.    Resulted Orders   Lipid panel reflex to direct LDL Fasting   Result Value Ref Range    Cholesterol 167 <200 mg/dL    Triglycerides 72 <150 mg/dL      Comment:      Fasting specimen    HDL Cholesterol 75 >39 mg/dL    LDL Cholesterol Calculated 78 <100 mg/dL      Comment:      Desirable:       <100 mg/dl    Non HDL Cholesterol 92 <130 mg/dL   Comprehensive metabolic panel   Result Value Ref Range    Sodium 141 133 - 144 mmol/L    Potassium 3.0 (L) 3.4 - 5.3 mmol/L    Chloride 103 94 - 109 mmol/L    Carbon Dioxide 28 20 - 32 mmol/L    Anion Gap 10 3 - 14 mmol/L    Glucose 88 70 - 99 mg/dL      Comment:      Fasting specimen    Urea Nitrogen 12 7 - 30 mg/dL    Creatinine 0.75 0.66 - 1.25 mg/dL    GFR Estimate 90 >60 mL/min/[1.73_m2]      Comment:      Non  GFR Calc  Starting 12/18/2018, serum creatinine based estimated GFR (eGFR) will be   calculated using the Chronic Kidney Disease Epidemiology Collaboration   (CKD-EPI) equation.      GFR Estimate If Black >90 >60 mL/min/[1.73_m2]      Comment:       GFR Calc  Starting 12/18/2018, serum creatinine based estimated GFR (eGFR) will be   calculated using the Chronic Kidney Disease Epidemiology Collaboration   (CKD-EPI) equation.      Calcium 8.8 8.5 - 10.1 mg/dL    Bilirubin Total 0.6 0.2 - 1.3 mg/dL    Albumin 3.1 (L) 3.4 - 5.0 g/dL    Protein Total 7.1 6.8 - 8.8 g/dL    Alkaline Phosphatase 104 40 - 150 U/L    ALT 15 0 - 70 U/L    AST 18 0 - 45 U/L   CBC with platelets   Result Value Ref Range    WBC 7.0 4.0 - 11.0 10e9/L    RBC Count 3.35 (L) 4.4 - 5.9 10e12/L      Comment:      Results confirmed by repeat test     Hemoglobin 6.8 (LL) 13.3 - 17.7 g/dL      Comment:      Results confirmed by repeat test  Critical Value called to and read back by  DR MUKESH GERARD AT 1059 ON [id number]      Hematocrit 23.7 (L) 40.0 - 53.0 %      Comment:      Results confirmed by repeat test    MCV 71 (L) 78 - 100 fl      Comment:      Results confirmed by repeat test    MCH 20.3 (L) 26.5 - 33.0 pg      Comment:      Results confirmed by repeat test    MCHC 28.7 (L) 31.5 - 36.5 g/dL      Comment:      Results confirmed by repeat test    RDW 19.5 (H) 10.0 - 15.0 %      Comment:      Results confirmed by repeat test    Platelet Count 336 150 - 450 10e9/L   Blood Morphology Pathologist Review   Result Value Ref Range    Copath Report       Patient Name: BISHOP SHINE  MR#: [id number]  Specimen #: [id number]  Collected: 1/8/2019  Received: 1/9/2019  Reported: 1/9/2019 08:48  Ordering Phy(s): MUKESH POOL    For improved result formatting, select 'View Enhanced Report Format' under   Linked Documents section.    TEST(S):  Peripheral Smear Morphology    FINAL DIAGNOSIS:  Peripheral blood.  - Hypochromic microcytic anemia.    COMMENT:  The hypochromic microcytic anemia could indicate iron deficiency in the   proper clinical setting due to blood  loss, nutritional deficiency, malabsorption, etc.  because there are a   small number of spherocytes, low grade  hemolysis cannot be excluded.  An anemia of mixed etiology cannot be   excluded.  Clinical correlation is  required.    Electronically signed out by:    Michael Celeste M.D.    CLINICAL HISTORY:  Anemia, has COPD.    PERIPHERAL BLOOD DATA:    PERIPHERAL BLOOD DATA  Patient Value (Reference Range >18 year old male)  7.0 .......WBC (4.0-11.0 x 10*9/L)  3.35  .......RBC (4.4-5.9 x 10*12/L)  6.8 .......HGB (13.3-17.7 g/dL)  23.7 .......HCT (40.0-53.0 %)  71 .......MCV (78-100fL)  20.3 .......MCH (26.5-33.0 pg)  28.7 .......MCHC (31.5-36.5 g/dL)  19.5 .......RDW (10.0-15.0 %)  336  .......PLT (150-450 x 10*9/L)  1.4 .......RETIC (0.5-2.0%)    PERIPHERAL BLOOD DIFFERENTIAL  (Reference ranges >18 year old)    Percent  74....Neutrophils, segmented and bands (40 - 75)  13....Lymphocytes (20 - 48)  11....Monocytes (0 - 12)  1....Eosinophils (0 - 6)  1....Basophils (0 - 2)    Absolute  5.1....Neutrophils, segmented and bands  (1.6 - 8.3 x 10*9/L)  0.9....Lymphocytes  (0.8 - 5.3 x 10*9/L)  0.8....Monocytes  (0 -1.3 x 10*9/L)  0.1....Eosinophils  (0 - 0.7 x 10*9/L)  0.1....Basophils  (0 - 0.2 x 10*9/L)    PERIPHERAL MORPHOLOGY:    ERYTHROCYTES: Appear hypochromic and show mild anisopoikilocytosis.  There   are scattered target forms and  elliptocytes.  No significant fragmentation is seen.  Some of the cells   appear dimorphic and there are  scattere d spherocytes.    LEUKOCYTES: Granulocytes appear predominantly mature and segmented without   significant left shift.  Lymphocytes are small.  Monocytes appear mature.  No obvious dysplastic   changes are seen.  No blasts are  identified.    PLATELETS: Appear normal in number.    CPT Codes:  A: 22856-[facility]    TESTING LAB LOCATION:  [facility] [address]  [id number] [phone]    COLLECTION SITE:  Client:  [facility]  Location:  [facility] (R)     CBC with platelets differential   Result Value Ref Range    WBC 7.0 4.0 - 11.0 10e9/L    RBC Count 3.35 (L) 4.4 - 5.9 10e12/L      Comment:      Results confirmed by repeat test    Hemoglobin 6.8 (LL) 13.3 - 17.7 g/dL      Comment:      Results confirmed by repeat test  Critical Value called to and read back by  MUKESH POOL MD ON 1.8.19 AT 1059 BY CL      Hematocrit 23.7 (L) 40.0 - 53.0 %      Comment:      Results confirmed by repeat test    MCV 71 (L) 78 - 100 fl      Comment:      Results confirmed by repeat test    MCH 20.3 (L) 26.5 - 33.0 pg      Comment:      Results confirmed by repeat test    MCHC 28.7 (L) 31.5 - 36.5 g/dL      Comment:      Results  confirmed by repeat test    RDW 19.5 (H) 10.0 - 15.0 %      Comment:      Results confirmed by repeat test    Platelet Count 336 150 - 450 10e9/L    % Neutrophils 74.0 %    % Lymphocytes 13.0 %    % Monocytes 11.0 %    % Eosinophils 1.0 %    % Basophils 1.0 %    Absolute Neutrophil 5.1 1.6 - 8.3 10e9/L    Absolute Lymphocytes 0.9 0.8 - 5.3 10e9/L    Absolute Monocytes 0.8 0.0 - 1.3 10e9/L    Absolute Eosinophils 0.1 0.0 - 0.7 10e9/L    Absolute Basophils 0.1 0.0 - 0.2 10e9/L    Anisocytosis Slight     RBC Fragments Slight     Microcytes Present     Hypochromasia Present     Platelet Estimate       Automated count confirmed.  Platelet morphology is normal.    Diff Method Automated Method    Reticulocyte Count   Result Value Ref Range    % Retic 1.4 0.5 - 2.0 %    Absolute Retic 48.9 25 - 95 10e9/L       If you have any questions or concerns, please call the clinic at the number listed above.       Sincerely,        Bob Zapata MD

## 2019-01-08 NOTE — PROGRESS NOTES
SUBJECTIVE:   Abhjieet De Oliveira is a 73 year old male who presents to clinic today for the following health issues:      Patient is here for a medication check.    SUBJECTIVE:    CC: Abhijeet De Oliveira is a 73 year old male who presents for follow up severe copd and anemia of chronic disease     HPI: he feels generally well, uses his inhalers but hasn't been taking his vitamins and iron, weight is stable, energy is moderate, he uses oxygen every night and on and off in the day time depending on his activity        PROBLEM LIST:                   Patient Active Problem List   Diagnosis     Seizure disorder (H)     Hyperlipidemia LDL goal <70     ASCVD (arteriosclerotic cardiovascular disease)     COPD (chronic obstructive pulmonary disease) (H)     COPD exacerbation (H)     Encounter for tobacco use cessation counseling       PAST MEDICAL HISTORY:                  Past Medical History:   Diagnosis Date     Arthritis      COPD (chronic obstructive pulmonary disease) (H)      Coronary artery disease     Pt. has had two MI's (2002 and 2007)     Hypercholesterolaemia      Osteoarthritis of both hips      Seizure disorder (H)     tegretol       PAST SURGICAL HISTORY:                  Past Surgical History:   Procedure Laterality Date     AMPUTATION      left middle finger     ANGIOPLASTY      coronary angioplasty w/stents 2002 & 2007     COLONOSCOPY       GALLBLADDER SURGERY  2001       CURRENT MEDICATIONS:                  Current Outpatient Medications   Medication Sig Dispense Refill     albuterol (PROAIR HFA/PROVENTIL HFA/VENTOLIN HFA) 108 (90 Base) MCG/ACT inhaler Inhale 1-2 puffs into the lungs every 4 hours as needed for shortness of breath / dyspnea 3 Inhaler 3     atorvastatin (LIPITOR) 40 MG tablet Take 1 tablet (40 mg) by mouth daily 90 tablet 0     budesonide-formoterol (SYMBICORT) 160-4.5 MCG/ACT Inhaler Inhale 2 puffs into the lungs 2 times daily. SCHEDULE APPOINTMENT FOR FURTHER REFILLS 3 Inhaler 3      carBAMazepine (TEGRETOL) 200 MG tablet Take 1 tablet (200 mg) by mouth 2 times daily (1 every morning and 1 every evening) 180 tablet 3     clopidogrel (PLAVIX) 75 MG tablet Take 1 tablet (75 mg) by mouth daily 90 tablet 3     doxycycline (VIBRAMYCIN) 100 MG capsule Take 1 capsule (100 mg) by mouth 2 times daily 20 capsule 2     ferrous gluconate (FERGON) 324 (38 FE) MG tablet Take 1 tablet (324 mg) by mouth 2 times daily 60 tablet 1     ipratropium - albuterol 0.5 mg/2.5 mg/3 mL (DUONEB) 0.5-2.5 (3) MG/3ML neb solution USE 1 VIAL VIA NEBULIZER 4 TIMES DAILY SCHEDULED AND USE EVERY 3 HOURS AS NEEDED FOR SHORTNESS OF BREATH 810 mL 0     lisinopril (PRINIVIL/ZESTRIL) 10 MG tablet Take 1 tablet (10 mg) by mouth daily 90 tablet 3     Multiple Vitamins-Minerals (CENTRUM SILVER) per tablet Take 1 tablet by mouth daily 1 tablet daily- MENS       nitroglycerin (NITROSTAT) 0.4 MG SL tablet Place 1 tablet (0.4 mg) under the tongue every 5 minutes as needed for chest pain Max of 3 tabs.  If still with symptoms call 911. 25 tablet 0     order for DME Nebulizer machine 1 Device 0     ORDER FOR DME Equipment being ordered: Oxygen- 24 hour portable oxygen at 2 liter flow. 1 Device PRN     predniSONE (DELTASONE) 20 MG tablet TAKE ONE TABLET BY MOUTH EVERY DAY 7 tablet 2             FAMILY HISTORY:                   Family History   Problem Relation Age of Onset     Genetic Disorder Mother      Coronary Artery Disease Father      Diabetes Maternal Grandmother        HEALTH MAINTENANCE:                    REVIEW OF OUTSIDE RECORDS: NO    REVIEW OF SYSTEMS:  CONSTITUTIONAL: NEGATIVE for fever, chills  EYES: NEGATIVE for vision changes   ENT/MOUTH: NEGATIVE for ear, mouth and throat problems  RESP: NEGATIVE for significant cough or SOB  CV: NEGATIVE for chest pain, palpitations   GI: NEGATIVE for nausea, abdominal pain, heartburn, or change in bowel habits  : NEGATIVE for frequency, dysuria, or hematuria  MUSCULOSKELETAL:  NEGATIVE for significant arthralgias or myalgia  NEURO: NEGATIVE for weakness, dizziness or paresthesias or headache  NVS:no headache or balance issus  INTEG:no moles or new rashes  LYMPH:no nodes or night sweats    EXAM:    /74 (BP Location: Right arm, Patient Position: Chair, Cuff Size: Adult Regular)   Pulse 100   Temp 98.1  F (36.7  C) (Oral)   Wt 58.1 kg (128 lb)   SpO2 92%   BMI 19.46 kg/m    GENERAL APPEARANCE: healthy, alert and no distress   EXAM:  GENERAL APPEARANCE: healthy, alert and no distress  EYES: EOMI,  PERRL  HENT: ear canals and TM's normal and nose and mouth without ulcers or lesions  RESP: lungs clear to auscultation - no rales, rhonchi or wheezes  CV: regular rates and rhythm, normal S1 S2, no S3 or S4 and no murmur, click or rub -  ABDOMEN:  soft, nontender, no HSM or masses and bowel sounds normal  MS: extremities normal- no gross deformities noted, no evidence of inflammation in joints, FROM in all extremities.  SKIN: no suspicious lesions or rashes  SKIN: no pallor   BACK:good rom    (J44.9) Stage 3 severe COPD by GOLD classification (H)  (primary encounter diagnosis)  Comment:   Plan: albuterol (PROAIR HFA/PROVENTIL HFA/VENTOLIN         HFA) 108 (90 Base) MCG/ACT inhaler,         budesonide-formoterol (SYMBICORT) 160-4.5         MCG/ACT Inhaler, CBC with platelets        rescue    (J44.9) COPD (chronic obstructive pulmonary disease) (H)  Comment:   Plan: controller effective     (E78.5) Hyperlipidemia LDL goal <70  Comment:   Plan: atorvastatin (LIPITOR) 40 MG tablet, Lipid         panel reflex to direct LDL Fasting,         Comprehensive metabolic panel        Check for med effects     (J44.1) Chronic obstructive pulmonary disease with acute exacerbation (H)  Comment:   Plan:     (I25.10) ASCVD (arteriosclerotic cardiovascular disease)  Comment:   Plan: clopidogrel (PLAVIX) 75 MG tablet, lisinopril         (PRINIVIL/ZESTRIL) 10 MG tablet        Vascular meds well tolerated      (D63.8) Anemia in other chronic diseases classified elsewhere  Comment:   Plan: Blood Morphology Pathologist Review, CBC with         platelets differential, Reticulocyte Count            (Z23) Need for prophylactic vaccination and inoculation against influenza  Comment:   Plan: FLU VACCINE, INCREASED ANTIGEN, PRESV FREE, AGE        65+ [39021]        given    (G40.909) Seizure disorder (H)  Comment:   Plan: no recurrence, sees [facility]MP                                I have discussed with patient the risks, benefits, medications, treatment options and modalities.   I have instructed the patient to call or schedule a follow-up appointment if any problems or failure to improve.

## 2019-01-08 NOTE — PROGRESS NOTES

## 2019-01-09 LAB
ALBUMIN SERPL-MCNC: 3.1 G/DL (ref 3.4–5)
ALP SERPL-CCNC: 104 U/L (ref 40–150)
ALT SERPL W P-5'-P-CCNC: 15 U/L (ref 0–70)
ANION GAP SERPL CALCULATED.3IONS-SCNC: 10 MMOL/L (ref 3–14)
AST SERPL W P-5'-P-CCNC: 18 U/L (ref 0–45)
BILIRUB SERPL-MCNC: 0.6 MG/DL (ref 0.2–1.3)
BUN SERPL-MCNC: 12 MG/DL (ref 7–30)
CALCIUM SERPL-MCNC: 8.8 MG/DL (ref 8.5–10.1)
CHLORIDE SERPL-SCNC: 103 MMOL/L (ref 94–109)
CHOLEST SERPL-MCNC: 167 MG/DL
CO2 SERPL-SCNC: 28 MMOL/L (ref 20–32)
COPATH REPORT: NORMAL
CREAT SERPL-MCNC: 0.75 MG/DL (ref 0.66–1.25)
GFR SERPL CREATININE-BSD FRML MDRD: 90 ML/MIN/{1.73_M2}
GLUCOSE SERPL-MCNC: 88 MG/DL (ref 70–99)
HDLC SERPL-MCNC: 75 MG/DL
LDLC SERPL CALC-MCNC: 78 MG/DL
NONHDLC SERPL-MCNC: 92 MG/DL
POTASSIUM SERPL-SCNC: 3 MMOL/L (ref 3.4–5.3)
PROT SERPL-MCNC: 7.1 G/DL (ref 6.8–8.8)
SODIUM SERPL-SCNC: 141 MMOL/L (ref 133–144)
TRIGL SERPL-MCNC: 72 MG/DL

## 2019-01-09 RX ORDER — FERROUS GLUCONATE 324(38)MG
324 TABLET ORAL 2 TIMES DAILY
Qty: 60 TABLET | Refills: 5 | Status: SHIPPED | OUTPATIENT
Start: 2019-01-09

## 2019-03-24 ENCOUNTER — TELEPHONE (OUTPATIENT)
Dept: FAMILY MEDICINE | Facility: CLINIC | Age: 74
End: 2019-03-24

## 2019-03-24 NOTE — TELEPHONE ENCOUNTER
Reason for call:  Other   Patient called regarding (reason for call): Please call the [facility] Medical Examiners Office back regarding  patient. Provider can speak with any investigator     Additional comments: None    Phone number to reach patient:  Other phone number:  [phone]

## 2019-03-26 ENCOUNTER — TELEPHONE (OUTPATIENT)
Dept: FAMILY MEDICINE | Facility: CLINIC | Age: 74
End: 2019-03-26

## 2019-03-26 NOTE — TELEPHONE ENCOUNTER
Spoke with Joselin with  Investigators office. She will fax form to [facility] with what info they are needing.

## 2019-03-26 NOTE — TELEPHONE ENCOUNTER
Bob Zapata MD, can you call medical examiner's office, see below  Yara Leiva RN, BSN  Message handled by Nurse Triage.

## 2019-03-26 NOTE — TELEPHONE ENCOUNTER
Reason for call:  Other   Patient called regarding (reason for call): Joselin with [facility] Investigator's office needing to verify some info on this Pt.  Would like info on last visit/medications, and if Dr would sign Death certificate; said can speak to any investigator at [phone]  Additional comments:     Phone number to reach patient:  Other phone number:  [phone]    Best Time:      Can we leave a detailed message on this number?  NO

## 2019-03-26 NOTE — TELEPHONE ENCOUNTER
Fax received, faxed detailed visit notes from 1/8/19 to [phone], also added problem list and med list, discussed with TC, also informed that Bob Zapata MD out today and will have Bob Zapata MD call tomorrow, see other encounter, FYI sent    Yara Leiva RN, BSN  Message handled by Nurse Triage.

## 2020-01-02 ENCOUNTER — TELEPHONE (OUTPATIENT)
Dept: FAMILY MEDICINE | Facility: CLINIC | Age: 75
End: 2020-01-02

## 2020-01-02 NOTE — TELEPHONE ENCOUNTER
Summary:    Patient is due/failing the following:   COLONOSCOPY    Reviewed:  [x] CARE EVERYWHERE  [x] LAST OV NOTE INCLUDING ENDO  [x] FYI TAB  [x] LAST PANEL ENCOUNTER  [x] FUTURE APTS  [x] MYCHART STATUS   [x] IMMUNIZATIONS  Action needed:   Patient needs referral/order: colonoscopy    Type of outreach:    Pt will be turning 75 in a couple of moths and will no longer need.                                                                               Ashley Murphy